# Patient Record
Sex: FEMALE | Race: WHITE | NOT HISPANIC OR LATINO | URBAN - METROPOLITAN AREA
[De-identification: names, ages, dates, MRNs, and addresses within clinical notes are randomized per-mention and may not be internally consistent; named-entity substitution may affect disease eponyms.]

---

## 2019-06-19 RX ADMIN — Medication 800 MILLIGRAM(S): at 15:37

## 2019-07-11 ENCOUNTER — EMERGENCY (EMERGENCY)
Facility: HOSPITAL | Age: 84
LOS: 0 days | Discharge: HOME | End: 2019-07-11
Attending: EMERGENCY MEDICINE | Admitting: EMERGENCY MEDICINE
Payer: COMMERCIAL

## 2019-07-11 VITALS
SYSTOLIC BLOOD PRESSURE: 127 MMHG | RESPIRATION RATE: 17 BRPM | HEART RATE: 77 BPM | DIASTOLIC BLOOD PRESSURE: 82 MMHG | OXYGEN SATURATION: 100 % | TEMPERATURE: 98 F

## 2019-07-11 VITALS
DIASTOLIC BLOOD PRESSURE: 75 MMHG | SYSTOLIC BLOOD PRESSURE: 142 MMHG | WEIGHT: 100.09 LBS | HEIGHT: 66 IN | HEART RATE: 86 BPM | RESPIRATION RATE: 17 BRPM | TEMPERATURE: 98 F | OXYGEN SATURATION: 97 %
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
## 2019-07-11 DIAGNOSIS — M54.2 CERVICALGIA: ICD-10-CM

## 2019-07-11 DIAGNOSIS — S32.10XA UNSPECIFIED FRACTURE OF SACRUM, INITIAL ENCOUNTER FOR CLOSED FRACTURE: ICD-10-CM

## 2019-07-11 DIAGNOSIS — M54.5 LOW BACK PAIN: ICD-10-CM

## 2019-07-11 DIAGNOSIS — Z96.643 PRESENCE OF ARTIFICIAL HIP JOINT, BILATERAL: ICD-10-CM

## 2019-07-11 DIAGNOSIS — M19.90 UNSPECIFIED OSTEOARTHRITIS, UNSPECIFIED SITE: ICD-10-CM

## 2019-07-11 LAB
ALBUMIN SERPL ELPH-MCNC: 3.4 G/DL — SIGNIFICANT CHANGE UP (ref 3.3–5)
ALP SERPL-CCNC: 98 U/L — SIGNIFICANT CHANGE UP (ref 40–120)
ALT FLD-CCNC: 25 U/L — SIGNIFICANT CHANGE UP (ref 12–78)
ANION GAP SERPL CALC-SCNC: 7 MMOL/L — SIGNIFICANT CHANGE UP (ref 5–17)
APPEARANCE UR: CLEAR — SIGNIFICANT CHANGE UP
APTT BLD: 26.2 SEC — LOW (ref 27.5–36.3)
AST SERPL-CCNC: 27 U/L — SIGNIFICANT CHANGE UP (ref 15–37)
BACTERIA # UR AUTO: ABNORMAL
BASOPHILS # BLD AUTO: 0.02 K/UL — SIGNIFICANT CHANGE UP (ref 0–0.2)
BASOPHILS NFR BLD AUTO: 0.2 % — SIGNIFICANT CHANGE UP (ref 0–2)
BILIRUB SERPL-MCNC: 0.5 MG/DL — SIGNIFICANT CHANGE UP (ref 0.2–1.2)
BILIRUB UR-MCNC: NEGATIVE — SIGNIFICANT CHANGE UP
BUN SERPL-MCNC: 22 MG/DL — SIGNIFICANT CHANGE UP (ref 7–23)
CALCIUM SERPL-MCNC: 8 MG/DL — LOW (ref 8.5–10.1)
CHLORIDE SERPL-SCNC: 98 MMOL/L — SIGNIFICANT CHANGE UP (ref 96–108)
CO2 SERPL-SCNC: 25 MMOL/L — SIGNIFICANT CHANGE UP (ref 22–31)
COLOR SPEC: YELLOW — SIGNIFICANT CHANGE UP
CREAT SERPL-MCNC: 0.62 MG/DL — SIGNIFICANT CHANGE UP (ref 0.5–1.3)
DIFF PNL FLD: ABNORMAL
EOSINOPHIL # BLD AUTO: 0.09 K/UL — SIGNIFICANT CHANGE UP (ref 0–0.5)
EOSINOPHIL NFR BLD AUTO: 0.9 % — SIGNIFICANT CHANGE UP (ref 0–6)
EPI CELLS # UR: ABNORMAL
GLUCOSE SERPL-MCNC: 84 MG/DL — SIGNIFICANT CHANGE UP (ref 70–99)
GLUCOSE UR QL: NEGATIVE MG/DL — SIGNIFICANT CHANGE UP
HCT VFR BLD CALC: 28.2 % — LOW (ref 34.5–45)
HGB BLD-MCNC: 8.5 G/DL — LOW (ref 11.5–15.5)
IMM GRANULOCYTES NFR BLD AUTO: 1.3 % — SIGNIFICANT CHANGE UP (ref 0–1.5)
INR BLD: 0.97 RATIO — SIGNIFICANT CHANGE UP (ref 0.88–1.16)
KETONES UR-MCNC: NEGATIVE — SIGNIFICANT CHANGE UP
LEUKOCYTE ESTERASE UR-ACNC: ABNORMAL
LYMPHOCYTES # BLD AUTO: 1.1 K/UL — SIGNIFICANT CHANGE UP (ref 1–3.3)
LYMPHOCYTES # BLD AUTO: 10.7 % — LOW (ref 13–44)
MCHC RBC-ENTMCNC: 24.4 PG — LOW (ref 27–34)
MCHC RBC-ENTMCNC: 30.1 GM/DL — LOW (ref 32–36)
MCV RBC AUTO: 81 FL — SIGNIFICANT CHANGE UP (ref 80–100)
MONOCYTES # BLD AUTO: 1.14 K/UL — HIGH (ref 0–0.9)
MONOCYTES NFR BLD AUTO: 11.1 % — SIGNIFICANT CHANGE UP (ref 2–14)
NEUTROPHILS # BLD AUTO: 7.77 K/UL — HIGH (ref 1.8–7.4)
NEUTROPHILS NFR BLD AUTO: 75.8 % — SIGNIFICANT CHANGE UP (ref 43–77)
NITRITE UR-MCNC: NEGATIVE — SIGNIFICANT CHANGE UP
PH UR: 6 — SIGNIFICANT CHANGE UP (ref 5–8)
PLATELET # BLD AUTO: 316 K/UL — SIGNIFICANT CHANGE UP (ref 150–400)
POTASSIUM SERPL-MCNC: 4.2 MMOL/L — SIGNIFICANT CHANGE UP (ref 3.5–5.3)
POTASSIUM SERPL-SCNC: 4.2 MMOL/L — SIGNIFICANT CHANGE UP (ref 3.5–5.3)
PROT SERPL-MCNC: 6.5 GM/DL — SIGNIFICANT CHANGE UP (ref 6–8.3)
PROT UR-MCNC: 15 MG/DL
PROTHROM AB SERPL-ACNC: 10.7 SEC — SIGNIFICANT CHANGE UP (ref 10–12.9)
RBC # BLD: 3.48 M/UL — LOW (ref 3.8–5.2)
RBC # FLD: 16.3 % — HIGH (ref 10.3–14.5)
RBC CASTS # UR COMP ASSIST: ABNORMAL /HPF (ref 0–4)
SODIUM SERPL-SCNC: 130 MMOL/L — LOW (ref 135–145)
SP GR SPEC: 1.01 — SIGNIFICANT CHANGE UP (ref 1.01–1.02)
UROBILINOGEN FLD QL: NEGATIVE MG/DL — SIGNIFICANT CHANGE UP
WBC # BLD: 10.25 K/UL — SIGNIFICANT CHANGE UP (ref 3.8–10.5)
WBC # FLD AUTO: 10.25 K/UL — SIGNIFICANT CHANGE UP (ref 3.8–10.5)
WBC UR QL: SIGNIFICANT CHANGE UP

## 2019-07-11 PROCEDURE — 93010 ELECTROCARDIOGRAM REPORT: CPT

## 2019-07-11 PROCEDURE — 72125 CT NECK SPINE W/O DYE: CPT | Mod: 26

## 2019-07-11 PROCEDURE — 76376 3D RENDER W/INTRP POSTPROCES: CPT | Mod: 26

## 2019-07-11 PROCEDURE — 86077 PHYS BLOOD BANK SERV XMATCH: CPT

## 2019-07-11 PROCEDURE — 70450 CT HEAD/BRAIN W/O DYE: CPT | Mod: 26

## 2019-07-11 PROCEDURE — 99285 EMERGENCY DEPT VISIT HI MDM: CPT

## 2019-07-11 PROCEDURE — 73523 X-RAY EXAM HIPS BI 5/> VIEWS: CPT | Mod: 26

## 2019-07-11 PROCEDURE — 72192 CT PELVIS W/O DYE: CPT | Mod: 26

## 2019-07-11 PROCEDURE — 73552 X-RAY EXAM OF FEMUR 2/>: CPT | Mod: 26,50

## 2019-07-11 RX ORDER — SODIUM CHLORIDE 9 MG/ML
1000 INJECTION INTRAMUSCULAR; INTRAVENOUS; SUBCUTANEOUS ONCE
Refills: 0 | Status: COMPLETED | OUTPATIENT
Start: 2019-07-11 | End: 2019-07-11

## 2019-07-11 RX ORDER — ACETAMINOPHEN 500 MG
1000 TABLET ORAL ONCE
Refills: 0 | Status: COMPLETED | OUTPATIENT
Start: 2019-07-11 | End: 2019-07-11

## 2019-07-11 RX ADMIN — SODIUM CHLORIDE 1000 MILLILITER(S): 9 INJECTION INTRAMUSCULAR; INTRAVENOUS; SUBCUTANEOUS at 17:58

## 2019-07-11 RX ADMIN — Medication 1000 MILLIGRAM(S): at 17:44

## 2019-07-11 RX ADMIN — Medication 1000 MILLIGRAM(S): at 18:14

## 2019-07-11 NOTE — ED PROVIDER NOTE - MUSCULOSKELETAL, MLM
+TTP bilateral ischial tuberosity. Mild distal right femur TTP w/ no deformity. Mild neck tenderness w/ no step off or deformity and normal ROM.

## 2019-07-11 NOTE — ED PROVIDER NOTE - OBJECTIVE STATEMENT
89 y/o female with PMHx of arthritis and s/p b/l hip replacement (done by Dr. Watson) presents to the ED BIBEMS s/p slip out of the chair. Pt says she fell on buttocks and hit the back of her head. -LOC.  +buttocks and neck pain. Cannot stand as she normally can. Pt normally walks with a walker. Pt is not on blood thinners.

## 2019-07-11 NOTE — ED ADULT NURSE NOTE - NSIMPLEMENTINTERV_GEN_ALL_ED
Implemented All Fall with Harm Risk Interventions:  [place] to call system. Call bell, personal items and telephone within reach. Instruct patient to call for assistance. Room bathroom lighting operational. Non-slip footwear when patient is off stretcher. Physically safe environment: no spills, clutter or unnecessary equipment. Stretcher in lowest position, wheels locked, appropriate side rails in place. Provide visual cue, wrist band, yellow gown, etc. Monitor gait and stability. Monitor for mental status changes and reorient to person, place, and time. Review medications for side effects contributing to fall risk. Reinforce activity limits and safety measures with patient and family. Provide visual clues: red socks.

## 2019-07-11 NOTE — ED ADULT NURSE NOTE - OBJECTIVE STATEMENT
Pt [place] s/p fall off her chair while at work at the [employer]. Pt states that she turned to get papers and slid right off the chair landing on her buttocks and then fell back and hit her head. Pt denies and LOC. Pt denies any AC. Pt walks with a walker and was unable to get up and walk after incident. Pt has a hx of bilateral hip replacements.

## 2019-07-11 NOTE — ED ADULT TRIAGE NOTE - CHIEF COMPLAINT QUOTE
patient brought in by EMS after patient states she slid out of her chair at the library. patient reports she fell onto her buttock and then hit her head on the floor. denies use of blood thinners. as per EMS, patient normally ambulates with walker but was unable to stand after fall. no LOC reported.

## 2019-07-11 NOTE — ED PROVIDER NOTE - PROGRESS NOTE DETAILS
Marie BHAT for ED attending, Dr. Gauthier: Pt ambulated well. Signing chart today, noticed CT pelvis was addended from chronic pelvic fx, to include a minimally displaced transversely oriented fracture of the sacrum with muscular edema and hematoma.  Apparently this was communicated today with the ED.  I will speak with PAs about this and see if patient was contacted.  If not, I will contact myself.  Will need to speak with orthopedics about plan for patient.  Booker Gauthier D.O.

## 2019-07-11 NOTE — ED ADULT NURSE REASSESSMENT NOTE - NS ED NURSE REASSESS COMMENT FT1
Pt able to ambulate with walker with minimal pain. Pt uses walker on daily basis. Plan is for pt to be discharged home with son. Comfort and safety maintained.

## 2019-07-11 NOTE — ED ADULT NURSE NOTE - CHPI ED NUR SYMPTOMS NEG
no fever/no loss of consciousness/no confusion/no numbness/no tingling/no bleeding/no vomiting/no abrasion/no deformity

## 2019-07-12 ENCOUNTER — INPATIENT (INPATIENT)
Facility: HOSPITAL | Age: 84
LOS: 9 days | Discharge: HOME | End: 2019-07-22
Attending: HOSPITALIST | Admitting: HOSPITALIST
Payer: COMMERCIAL

## 2019-07-12 VITALS
TEMPERATURE: 98 F | HEIGHT: 66 IN | OXYGEN SATURATION: 95 % | DIASTOLIC BLOOD PRESSURE: 65 MMHG | HEART RATE: 91 BPM | RESPIRATION RATE: 17 BRPM | WEIGHT: 100.09 LBS | SYSTOLIC BLOOD PRESSURE: 122 MMHG

## 2019-07-12 DIAGNOSIS — Z96.643 PRESENCE OF ARTIFICIAL HIP JOINT, BILATERAL: Chronic | ICD-10-CM

## 2019-07-12 LAB
ALBUMIN SERPL ELPH-MCNC: 2.7 G/DL — LOW (ref 3.3–5)
ALP SERPL-CCNC: 91 U/L — SIGNIFICANT CHANGE UP (ref 40–120)
ALT FLD-CCNC: 27 U/L — SIGNIFICANT CHANGE UP (ref 12–78)
ANION GAP SERPL CALC-SCNC: 10 MMOL/L — SIGNIFICANT CHANGE UP (ref 5–17)
ANISOCYTOSIS BLD QL: SIGNIFICANT CHANGE UP
APPEARANCE UR: CLEAR — SIGNIFICANT CHANGE UP
APTT BLD: 26.5 SEC — LOW (ref 27.5–36.3)
AST SERPL-CCNC: 23 U/L — SIGNIFICANT CHANGE UP (ref 15–37)
BACTERIA # UR AUTO: ABNORMAL
BASOPHILS # BLD AUTO: 0 K/UL — SIGNIFICANT CHANGE UP (ref 0–0.2)
BASOPHILS NFR BLD AUTO: 0 % — SIGNIFICANT CHANGE UP (ref 0–2)
BILIRUB SERPL-MCNC: 0.4 MG/DL — SIGNIFICANT CHANGE UP (ref 0.2–1.2)
BILIRUB UR-MCNC: NEGATIVE — SIGNIFICANT CHANGE UP
BLD GP AB SCN SERPL QL: ABNORMAL
BUN SERPL-MCNC: 18 MG/DL — SIGNIFICANT CHANGE UP (ref 7–23)
CALCIUM SERPL-MCNC: 8.1 MG/DL — LOW (ref 8.5–10.1)
CHLORIDE SERPL-SCNC: 104 MMOL/L — SIGNIFICANT CHANGE UP (ref 96–108)
CO2 SERPL-SCNC: 22 MMOL/L — SIGNIFICANT CHANGE UP (ref 22–31)
COLOR SPEC: YELLOW — SIGNIFICANT CHANGE UP
CREAT SERPL-MCNC: 0.46 MG/DL — LOW (ref 0.5–1.3)
DIFF PNL FLD: NEGATIVE — SIGNIFICANT CHANGE UP
EOSINOPHIL # BLD AUTO: 0.11 K/UL — SIGNIFICANT CHANGE UP (ref 0–0.5)
EOSINOPHIL NFR BLD AUTO: 1 % — SIGNIFICANT CHANGE UP (ref 0–6)
EPI CELLS # UR: ABNORMAL
GLUCOSE SERPL-MCNC: 123 MG/DL — HIGH (ref 70–99)
GLUCOSE UR QL: NEGATIVE MG/DL — SIGNIFICANT CHANGE UP
HCT VFR BLD CALC: 23 % — LOW (ref 34.5–45)
HGB BLD-MCNC: 6.9 G/DL — CRITICAL LOW (ref 11.5–15.5)
HYPOCHROMIA BLD QL: SIGNIFICANT CHANGE UP
INR BLD: 1.04 RATIO — SIGNIFICANT CHANGE UP (ref 0.88–1.16)
KETONES UR-MCNC: ABNORMAL
LEUKOCYTE ESTERASE UR-ACNC: ABNORMAL
LYMPHOCYTES # BLD AUTO: 0.33 K/UL — LOW (ref 1–3.3)
LYMPHOCYTES # BLD AUTO: 3 % — LOW (ref 13–44)
MANUAL SMEAR VERIFICATION: SIGNIFICANT CHANGE UP
MCHC RBC-ENTMCNC: 23.9 PG — LOW (ref 27–34)
MCHC RBC-ENTMCNC: 30 GM/DL — LOW (ref 32–36)
MCV RBC AUTO: 79.6 FL — LOW (ref 80–100)
MONOCYTES # BLD AUTO: 0.56 K/UL — SIGNIFICANT CHANGE UP (ref 0–0.9)
MONOCYTES NFR BLD AUTO: 5 % — SIGNIFICANT CHANGE UP (ref 2–14)
NEUTROPHILS # BLD AUTO: 10.11 K/UL — HIGH (ref 1.8–7.4)
NEUTROPHILS NFR BLD AUTO: 91 % — HIGH (ref 43–77)
NITRITE UR-MCNC: NEGATIVE — SIGNIFICANT CHANGE UP
NRBC # BLD: 0 /100 — SIGNIFICANT CHANGE UP (ref 0–0)
NRBC # BLD: SIGNIFICANT CHANGE UP /100 WBCS (ref 0–0)
PH UR: 5 — SIGNIFICANT CHANGE UP (ref 5–8)
PLAT MORPH BLD: NORMAL — SIGNIFICANT CHANGE UP
PLATELET # BLD AUTO: 228 K/UL — SIGNIFICANT CHANGE UP (ref 150–400)
POIKILOCYTOSIS BLD QL AUTO: SIGNIFICANT CHANGE UP
POLYCHROMASIA BLD QL SMEAR: SLIGHT — SIGNIFICANT CHANGE UP
POTASSIUM SERPL-MCNC: 3.8 MMOL/L — SIGNIFICANT CHANGE UP (ref 3.5–5.3)
POTASSIUM SERPL-SCNC: 3.8 MMOL/L — SIGNIFICANT CHANGE UP (ref 3.5–5.3)
PROT SERPL-MCNC: 5.4 GM/DL — LOW (ref 6–8.3)
PROT UR-MCNC: NEGATIVE MG/DL — SIGNIFICANT CHANGE UP
PROTHROM AB SERPL-ACNC: 11.6 SEC — SIGNIFICANT CHANGE UP (ref 10–12.9)
RBC # BLD: 2.89 M/UL — LOW (ref 3.8–5.2)
RBC # FLD: 16.3 % — HIGH (ref 10.3–14.5)
RBC BLD AUTO: ABNORMAL
RBC CASTS # UR COMP ASSIST: ABNORMAL /HPF (ref 0–4)
SODIUM SERPL-SCNC: 136 MMOL/L — SIGNIFICANT CHANGE UP (ref 135–145)
SP GR SPEC: 1.01 — SIGNIFICANT CHANGE UP (ref 1.01–1.02)
TYPE + AB SCN PNL BLD: SIGNIFICANT CHANGE UP
UROBILINOGEN FLD QL: NEGATIVE MG/DL — SIGNIFICANT CHANGE UP
WBC # BLD: 11.11 K/UL — HIGH (ref 3.8–10.5)
WBC # FLD AUTO: 11.11 K/UL — HIGH (ref 3.8–10.5)
WBC UR QL: >50

## 2019-07-12 PROCEDURE — 71045 X-RAY EXAM CHEST 1 VIEW: CPT | Mod: 26

## 2019-07-12 PROCEDURE — 93010 ELECTROCARDIOGRAM REPORT: CPT

## 2019-07-12 PROCEDURE — 72110 X-RAY EXAM L-2 SPINE 4/>VWS: CPT | Mod: 26

## 2019-07-12 PROCEDURE — 99223 1ST HOSP IP/OBS HIGH 75: CPT

## 2019-07-12 PROCEDURE — 72220 X-RAY EXAM SACRUM TAILBONE: CPT | Mod: 26

## 2019-07-12 PROCEDURE — 99285 EMERGENCY DEPT VISIT HI MDM: CPT

## 2019-07-12 RX ORDER — ONDANSETRON 8 MG/1
4 TABLET, FILM COATED ORAL ONCE
Refills: 0 | Status: COMPLETED | OUTPATIENT
Start: 2019-07-12 | End: 2019-07-12

## 2019-07-12 RX ORDER — LOSARTAN POTASSIUM 100 MG/1
25 TABLET, FILM COATED ORAL DAILY
Refills: 0 | Status: DISCONTINUED | OUTPATIENT
Start: 2019-07-12 | End: 2019-07-13

## 2019-07-12 RX ORDER — IRBESARTAN 75 MG/1
1 TABLET ORAL
Qty: 0 | Refills: 0 | DISCHARGE

## 2019-07-12 RX ORDER — IBUPROFEN 200 MG
1 TABLET ORAL
Qty: 0 | Refills: 0 | DISCHARGE

## 2019-07-12 RX ORDER — OXYCODONE AND ACETAMINOPHEN 5; 325 MG/1; MG/1
1 TABLET ORAL ONCE
Refills: 0 | Status: DISCONTINUED | OUTPATIENT
Start: 2019-07-12 | End: 2019-07-12

## 2019-07-12 RX ORDER — ACETAMINOPHEN 500 MG
650 TABLET ORAL EVERY 8 HOURS
Refills: 0 | Status: DISCONTINUED | OUTPATIENT
Start: 2019-07-12 | End: 2019-07-22

## 2019-07-12 RX ORDER — ATORVASTATIN CALCIUM 80 MG/1
20 TABLET, FILM COATED ORAL DAILY
Refills: 0 | Status: DISCONTINUED | OUTPATIENT
Start: 2019-07-12 | End: 2019-07-13

## 2019-07-12 RX ORDER — MORPHINE SULFATE 50 MG/1
2 CAPSULE, EXTENDED RELEASE ORAL ONCE
Refills: 0 | Status: DISCONTINUED | OUTPATIENT
Start: 2019-07-12 | End: 2019-07-12

## 2019-07-12 RX ORDER — SODIUM CHLORIDE 9 MG/ML
3 INJECTION INTRAMUSCULAR; INTRAVENOUS; SUBCUTANEOUS ONCE
Refills: 0 | Status: COMPLETED | OUTPATIENT
Start: 2019-07-12 | End: 2019-07-12

## 2019-07-12 RX ORDER — TIMOLOL 0.5 %
1 DROPS OPHTHALMIC (EYE) DAILY
Refills: 0 | Status: DISCONTINUED | OUTPATIENT
Start: 2019-07-12 | End: 2019-07-13

## 2019-07-12 RX ORDER — DOCUSATE SODIUM 100 MG
100 CAPSULE ORAL
Refills: 0 | Status: DISCONTINUED | OUTPATIENT
Start: 2019-07-12 | End: 2019-07-22

## 2019-07-12 RX ORDER — ATORVASTATIN CALCIUM 80 MG/1
1 TABLET, FILM COATED ORAL
Qty: 0 | Refills: 0 | DISCHARGE

## 2019-07-12 RX ORDER — OXYCODONE HYDROCHLORIDE 5 MG/1
2.5 TABLET ORAL EVERY 6 HOURS
Refills: 0 | Status: DISCONTINUED | OUTPATIENT
Start: 2019-07-12 | End: 2019-07-13

## 2019-07-12 RX ORDER — SODIUM CHLORIDE 9 MG/ML
500 INJECTION INTRAMUSCULAR; INTRAVENOUS; SUBCUTANEOUS ONCE
Refills: 0 | Status: COMPLETED | OUTPATIENT
Start: 2019-07-12 | End: 2019-07-12

## 2019-07-12 RX ADMIN — ONDANSETRON 4 MILLIGRAM(S): 8 TABLET, FILM COATED ORAL at 23:29

## 2019-07-12 RX ADMIN — SODIUM CHLORIDE 3 MILLILITER(S): 9 INJECTION INTRAMUSCULAR; INTRAVENOUS; SUBCUTANEOUS at 19:59

## 2019-07-12 RX ADMIN — MORPHINE SULFATE 2 MILLIGRAM(S): 50 CAPSULE, EXTENDED RELEASE ORAL at 19:52

## 2019-07-12 RX ADMIN — SODIUM CHLORIDE 500 MILLILITER(S): 9 INJECTION INTRAMUSCULAR; INTRAVENOUS; SUBCUTANEOUS at 19:40

## 2019-07-12 RX ADMIN — ONDANSETRON 4 MILLIGRAM(S): 8 TABLET, FILM COATED ORAL at 19:37

## 2019-07-12 RX ADMIN — MORPHINE SULFATE 2 MILLIGRAM(S): 50 CAPSULE, EXTENDED RELEASE ORAL at 19:37

## 2019-07-12 RX ADMIN — OXYCODONE AND ACETAMINOPHEN 1 TABLET(S): 5; 325 TABLET ORAL at 22:36

## 2019-07-12 NOTE — CONSULT NOTE ADULT - SUBJECTIVE AND OBJECTIVE BOX
Patient is a 88y Female called back to ED today after change to radiology read showing sacral fracture from fall/ED presentation yesterday 19.  Had mechanical fall day prior to admission with buttocks pain but was able to ambulate and was discharged home.  Has had difficulty ambulating since being home with persistent buttocks pain. Ambulates with walker at baseline.  Denies any worsening of injury/pain over last 24 hrs and denies any new falls/trauma.  Denies pain/injury elsewhere. Denies numbness/tingling/paresthesias/weakness. Denies bowel/bladder incontinence. Denies fevers/chills. No other complaints at this time.    PAST MEDICAL & SURGICAL HISTORY:  Blindness of left eye  Glaucoma, right eye  Osteoporosis  Hyperlipidemia  HTN (hypertension)  Arthritis  H/O right wrist surgery  H/O bilateral hip replacements    MEDICATIONS  (STANDING):  acetaminophen   Tablet .. 650 milliGRAM(s) Oral every 8 hours  atorvastatin 20 milliGRAM(s) Oral daily  docusate sodium 100 milliGRAM(s) Oral two times a day  losartan 25 milliGRAM(s) Oral daily  timolol 0.5% Solution 1 Drop(s) Right EYE daily      Allergies    No Known Allergies    Intolerances                           6.9    11.11 )-----------( 228      ( 2019 19:31 )             23.0       2019 19:31    136    |  104    |  18     ----------------------------<  123    3.8     |  22     |  0.46     Ca    8.1        2019 19:31    TPro  5.4    /  Alb  2.7    /  TBili  0.4    /  DBili  x      /  AST  23     /  ALT  27     /  AlkPhos  91     2019 19:31      PT/INR - ( 2019 19:31 )   PT: 11.6 sec;   INR: 1.04 ratio         PTT - ( 2019 19:31 )  PTT:26.5 sec    Urinalysis Basic - ( 2019 22:11 )    Color: Yellow / Appearance: Clear / S.015 / pH: x  Gluc: x / Ketone: Trace  / Bili: Negative / Urobili: Negative mg/dL   Blood: x / Protein: Negative mg/dL / Nitrite: Negative   Leuk Esterase: Small / RBC: 25-50 /HPF / WBC >50   Sq Epi: x / Non Sq Epi: Moderate / Bacteria: Moderate        Vital Signs Last 24 Hrs  T(C): 36.7 (19 @ 00:47), Max: 37.2 (19 @ 23:45)  T(F): 98.1 (19 @ 00:47), Max: 98.9 (19 @ 23:45)  HR: 94 (19 @ 00:47) (90 - 94)  BP: 116/65 (19 @ 00:47) (105/55 - 122/65)  BP(mean): 78 (19 @ 00:47) (78 - 78)  RR: 24 (19 @ 00:47) (17 - 24)  SpO2: 93% (19 @ 00:47) (93% - 95%)    Gen: NAD    Spine PE:  Skin intact  No gross deformity  No midnline TTP Sacral spine, No TTP C/T/L Spine  No bony step offs  No paraspinal muscle ttp/hypertonicity   Negative clonus  Negative babinski  Negative arango  No saddle anesthesia    Motor:                   C5                C6              C7               C8           T1   R            5/5                5/5            5/5             5/5          5/5  L             5/5               5/5             5/5             5/5          5/5                L2             L3             L4               L5            S1  R         5/5           5/5          5/5             5/5           5/5  L          5/5          5/5           5/5             5/5           5/5    Sensory:            C5         C6         C7      C8       T1        (0=absent, 1=impaired, 2=normal, NT=not testable)  R         2            2           2        2         2  L          2            2           2        2         2               L2          L3         L4      L5       S1         (0=absent, 1=impaired, 2=normal, NT=not testable)  R         2            2            2        2        2  L          2            2           2        2         2    Secondary Survey: No TTP over bony prominences, SILT, palpable pulses, full/painless range of motion, compartments soft    Imaging: Xrays/CT from 19 demonstrate displaced transverse S2 fracture    A/P: 88y Female with with sacral fracture  Pain control  WBAT with assistive devices as needed  FU Labs/imaging  SCDs  No acute orthopedic surgical intervention  Can follow up with Dr. Mixon as outpatient once discharged from hospital or rehab  Discussed with Dr. Mixon, agrees with above

## 2019-07-12 NOTE — H&P ADULT - NSHPPHYSICALEXAM_GEN_ALL_CORE
BP = 102/60  HR = 90 irregularly irregular  RR = 18  SpO2 = 96% on room air    A+Ox3  left eye mostly closed and eyelids sunken (not wearing prosthesis)  mucous membranes moist  soft / NT / ND  sacral tenderness  no bruising over sacrum  1+ bilateral DP pulses  pale  calm

## 2019-07-12 NOTE — ED PROVIDER NOTE - ENMT, MLM
Airway patent, Nasal mucosa clear. Throat has no vesicles, no oropharyngeal exudates and uvula is midline. Head: normocephalic, atraumatic. +mucous membranes slightly dry

## 2019-07-12 NOTE — H&P ADULT - HISTORY OF PRESENT ILLNESS
88F fall from standing yesterday, no LOC  seen and examined 07-12-19 @ 2040 as trauma consult (consulted 07-12-19 @ 2035)    seen in ER yesterday and discharged home.  she was able to ambulate, but with a significant amount of pain in the sacrum.  she was called back to the ER today for a missed sacral fracture on CT pelvis.  no prior falls.

## 2019-07-12 NOTE — ED PROVIDER NOTE - MUSCULOSKELETAL, MLM
Spine appears normal, bilateral lower extremities motor sensory intact. neck: non tender, supple, without pain. pelvis: non tender, stable. +sacrum TTP. +DP pulses decreased, bilaterally equal +bilateral legs nontender, SLR 10 degrees bilaterally

## 2019-07-12 NOTE — ED PROVIDER NOTE - CLINICAL SUMMARY MEDICAL DECISION MAKING FREE TEXT BOX
89 y/o female with PMHx of arthritis and s/p bilateral hip replacement (done by Dr. Watson) presents to the ED BIBA from home as radiology recall. Yesterday, pt slipped out of chair onto buttocks. Workup with no acute injury +acute sacral fx. Over course of today, worsening pain, unable to ambulate despite walker assistance. Unable to perform ADLs, +lives alone. CT pelvis official reread today, +acute sacral fx with mild displacement and hematoma. Plan: repeat labs, spine consult, pt will require admission for pain management, PT, expect rehab placement.

## 2019-07-12 NOTE — H&P ADULT - NSHPSOCIALHISTORY_GEN_ALL_CORE
lives alone  works at [employer]  no tobacco  1 glass wine with dinner  no drugs lives alone  works as an  at [employer]  no tobacco  1 glass wine with dinner  no drugs

## 2019-07-12 NOTE — ED PROVIDER NOTE - SKIN, MLM
Skin normal color for race, warm, dry and intact. No evidence of rash. No discoloration overlying the sacrum

## 2019-07-12 NOTE — ED ADULT NURSE NOTE - OBJECTIVE STATEMENT
Patient presenst with lower back pain, patient was evaluated yesterday for slip and fall and was discharged home. Patient reports increased pain after discharge. CT results were amended after discharge. Patient reports difficulty standing / ambulating at home. Patient lives alone and uses walker

## 2019-07-12 NOTE — H&P ADULT - NSICDXPASTMEDICALHX_GEN_ALL_CORE_FT
PAST MEDICAL HISTORY:  Arthritis     Blindness of left eye     Glaucoma, right eye     HTN (hypertension)     Hyperlipidemia     Osteoporosis

## 2019-07-12 NOTE — ED PROVIDER NOTE - CARE PLAN
Principal Discharge DX:	Closed fracture of sacrum, unspecified portion of sacrum, sequela  Secondary Diagnosis:	Anemia

## 2019-07-12 NOTE — ED POST DISCHARGE NOTE - RESULT SUMMARY
Contacted patient's son at home reported revised reading of CT Pelvis revealing +sacral fracture. Reports patient having increased pain and difficulty walking and will bring patient back to ED. Marcos NP

## 2019-07-12 NOTE — H&P ADULT - NSHPLABSRESULTS_GEN_ALL_CORE
hgb = 6.9 g/dL (8.5 yesterday)  MCV = 79.6  HCO3 = 22    CT head / c-spine (7/11) - no injury    CT pelvis (7/11) - S2-3 minimally displaced transverse fracture. small bilateral piriformis hematomas. chronic pelvic fractures with malunion.    bilateral hip/femur X-rays (7/11) - bilateral CAYDEN without periprosthetic fractures    CXR (7/12) - no pneumothorax or hemothorax

## 2019-07-12 NOTE — ED ADULT TRIAGE NOTE - CHIEF COMPLAINT QUOTE
patient brought in by EMS for lower back pain and difficulty ambulating. patient was evaluated in ED yesterday s/p fall and the revised CT reading today showed a sacral fracture. patient in no acute distress at this time. patient worsens with movement.

## 2019-07-12 NOTE — H&P ADULT - ASSESSMENT
S2-3 minimally displaced sacral fractures  -WBAT is X-rays are stable (she was already ambulating at home yesterday)  -Tylenol and oxycodone PRN pain  -PT consult    small presacral hematoma  -admit to stepdown for close hemodynamic monitoring  -no evidence of ongoing hemorrhage based on stable VS and no metabolic acidosis  -repeat CBC tonight  -start chemical VTE prophylaxis if hgb remains stable tomorrow  -hold ibuprofen    new onset afib  -not currently a candidate for therapeutic anticoagulation given pelvic hematoma  -f/u with PMD S2-3 minimally displaced sacral fractures  -ortho spine consult  -WBAT is X-rays are stable (she was already ambulating at home yesterday)  -Tylenol and oxycodone PRN pain  -PT consult    small presacral hematoma  -admit to stepdown for close hemodynamic monitoring  -no evidence of ongoing hemorrhage based on stable VS and no metabolic acidosis  -repeat CBC tonight  -start chemical VTE prophylaxis if hgb remains stable tomorrow  -hold ibuprofen    new onset afib  -not currently a candidate for therapeutic anticoagulation given pelvic hematoma  -f/u with PMD

## 2019-07-12 NOTE — ED PROVIDER NOTE - OBJECTIVE STATEMENT
89 y/o female with PMHx of arthritis and s/p bilateral hip replacement (done by Dr. Watson) presents to the ED BIBEMS c/o lower back pain. Pt was evaluated at [facility] yesterday s/p slip out of the chair, landing on buttocks. +hit back of head. Pt presents today for radiology call back after chronic sacral fx was amended today as acute sacral fracture with muscular edema and hematoma. Today, pt states difficulty standing and ambulating secondary to worsening posterior pelvis/back pain. Denies headache, nausea. Uses walker at baseline. Lives alone at home. NKDA. PMD: Jessica.

## 2019-07-12 NOTE — ED PROVIDER NOTE - PROGRESS NOTE DETAILS
Marie OLIVIER for ED attending, Dr. Olmos: Paging spine on-call. Dr. Olmos:  Paging Trauma attdg on call & re-paging Spine attdg on call. Marie OLIVIER for ED attending, Dr. Olmos: [facility] aware of consult. Will see pt in AM. No acute intervention indicated tonight.

## 2019-07-13 DIAGNOSIS — Z98.890 OTHER SPECIFIED POSTPROCEDURAL STATES: Chronic | ICD-10-CM

## 2019-07-13 LAB
HCT VFR BLD CALC: 21.1 % — LOW (ref 34.5–45)
HCT VFR BLD CALC: 27.5 % — LOW (ref 34.5–45)
HCT VFR BLD CALC: 27.7 % — LOW (ref 34.5–45)
HGB BLD-MCNC: 6.3 G/DL — CRITICAL LOW (ref 11.5–15.5)
HGB BLD-MCNC: 8.5 G/DL — LOW (ref 11.5–15.5)
HGB BLD-MCNC: 8.6 G/DL — LOW (ref 11.5–15.5)
IRON SATN MFR SERPL: 29 % — SIGNIFICANT CHANGE UP (ref 14–50)
IRON SATN MFR SERPL: 95 UG/DL — SIGNIFICANT CHANGE UP (ref 30–160)
MCHC RBC-ENTMCNC: 24 PG — LOW (ref 27–34)
MCHC RBC-ENTMCNC: 25.1 PG — LOW (ref 27–34)
MCHC RBC-ENTMCNC: 25.1 PG — LOW (ref 27–34)
MCHC RBC-ENTMCNC: 29.9 GM/DL — LOW (ref 32–36)
MCHC RBC-ENTMCNC: 30.7 GM/DL — LOW (ref 32–36)
MCHC RBC-ENTMCNC: 31.3 GM/DL — LOW (ref 32–36)
MCV RBC AUTO: 80.2 FL — SIGNIFICANT CHANGE UP (ref 80–100)
MCV RBC AUTO: 80.5 FL — SIGNIFICANT CHANGE UP (ref 80–100)
MCV RBC AUTO: 81.7 FL — SIGNIFICANT CHANGE UP (ref 80–100)
PLATELET # BLD AUTO: 195 K/UL — SIGNIFICANT CHANGE UP (ref 150–400)
PLATELET # BLD AUTO: 211 K/UL — SIGNIFICANT CHANGE UP (ref 150–400)
PLATELET # BLD AUTO: 215 K/UL — SIGNIFICANT CHANGE UP (ref 150–400)
RBC # BLD: 2.62 M/UL — LOW (ref 3.8–5.2)
RBC # BLD: 3.39 M/UL — LOW (ref 3.8–5.2)
RBC # BLD: 3.43 M/UL — LOW (ref 3.8–5.2)
RBC # FLD: 15.7 % — HIGH (ref 10.3–14.5)
RBC # FLD: 16.1 % — HIGH (ref 10.3–14.5)
RBC # FLD: 16.4 % — HIGH (ref 10.3–14.5)
TIBC SERPL-MCNC: 326 UG/DL — SIGNIFICANT CHANGE UP (ref 220–430)
UIBC SERPL-MCNC: 231 UG/DL — SIGNIFICANT CHANGE UP (ref 110–370)
WBC # BLD: 10.57 K/UL — HIGH (ref 3.8–10.5)
WBC # BLD: 8.62 K/UL — SIGNIFICANT CHANGE UP (ref 3.8–10.5)
WBC # BLD: 9.47 K/UL — SIGNIFICANT CHANGE UP (ref 3.8–10.5)
WBC # FLD AUTO: 10.57 K/UL — HIGH (ref 3.8–10.5)
WBC # FLD AUTO: 8.62 K/UL — SIGNIFICANT CHANGE UP (ref 3.8–10.5)
WBC # FLD AUTO: 9.47 K/UL — SIGNIFICANT CHANGE UP (ref 3.8–10.5)

## 2019-07-13 PROCEDURE — 99233 SBSQ HOSP IP/OBS HIGH 50: CPT

## 2019-07-13 RX ORDER — ATORVASTATIN CALCIUM 80 MG/1
10 TABLET, FILM COATED ORAL DAILY
Refills: 0 | Status: DISCONTINUED | OUTPATIENT
Start: 2019-07-13 | End: 2019-07-22

## 2019-07-13 RX ORDER — LATANOPROST 0.05 MG/ML
1 SOLUTION/ DROPS OPHTHALMIC; TOPICAL AT BEDTIME
Refills: 0 | Status: DISCONTINUED | OUTPATIENT
Start: 2019-07-13 | End: 2019-07-22

## 2019-07-13 RX ORDER — IRBESARTAN 75 MG/1
1 TABLET ORAL
Qty: 0 | Refills: 0 | DISCHARGE

## 2019-07-13 RX ORDER — LEVOBUNOLOL HCL 0.5 %
2 DROPS OPHTHALMIC (EYE)
Qty: 0 | Refills: 0 | DISCHARGE

## 2019-07-13 RX ORDER — LOSARTAN POTASSIUM 100 MG/1
50 TABLET, FILM COATED ORAL DAILY
Refills: 0 | Status: DISCONTINUED | OUTPATIENT
Start: 2019-07-13 | End: 2019-07-13

## 2019-07-13 RX ORDER — IBUPROFEN 200 MG
1 TABLET ORAL
Qty: 0 | Refills: 0 | DISCHARGE

## 2019-07-13 RX ORDER — MORPHINE SULFATE 50 MG/1
2 CAPSULE, EXTENDED RELEASE ORAL EVERY 4 HOURS
Refills: 0 | Status: DISCONTINUED | OUTPATIENT
Start: 2019-07-13 | End: 2019-07-14

## 2019-07-13 RX ORDER — ATORVASTATIN CALCIUM 80 MG/1
1 TABLET, FILM COATED ORAL
Qty: 0 | Refills: 0 | DISCHARGE

## 2019-07-13 RX ORDER — TRAMADOL HYDROCHLORIDE 50 MG/1
25 TABLET ORAL EVERY 6 HOURS
Refills: 0 | Status: DISCONTINUED | OUTPATIENT
Start: 2019-07-13 | End: 2019-07-14

## 2019-07-13 RX ORDER — TRAMADOL HYDROCHLORIDE 50 MG/1
50 TABLET ORAL EVERY 6 HOURS
Refills: 0 | Status: DISCONTINUED | OUTPATIENT
Start: 2019-07-13 | End: 2019-07-14

## 2019-07-13 RX ADMIN — OXYCODONE HYDROCHLORIDE 2.5 MILLIGRAM(S): 5 TABLET ORAL at 09:30

## 2019-07-13 RX ADMIN — OXYCODONE AND ACETAMINOPHEN 1 TABLET(S): 5; 325 TABLET ORAL at 00:26

## 2019-07-13 RX ADMIN — Medication 650 MILLIGRAM(S): at 05:43

## 2019-07-13 RX ADMIN — LATANOPROST 1 DROP(S): 0.05 SOLUTION/ DROPS OPHTHALMIC; TOPICAL at 21:07

## 2019-07-13 RX ADMIN — OXYCODONE HYDROCHLORIDE 2.5 MILLIGRAM(S): 5 TABLET ORAL at 10:00

## 2019-07-13 RX ADMIN — Medication 650 MILLIGRAM(S): at 21:00

## 2019-07-13 RX ADMIN — TRAMADOL HYDROCHLORIDE 50 MILLIGRAM(S): 50 TABLET ORAL at 17:23

## 2019-07-13 RX ADMIN — ATORVASTATIN CALCIUM 10 MILLIGRAM(S): 80 TABLET, FILM COATED ORAL at 13:28

## 2019-07-13 RX ADMIN — MORPHINE SULFATE 2 MILLIGRAM(S): 50 CAPSULE, EXTENDED RELEASE ORAL at 22:42

## 2019-07-13 RX ADMIN — Medication 100 MILLIGRAM(S): at 05:01

## 2019-07-13 RX ADMIN — Medication 650 MILLIGRAM(S): at 20:24

## 2019-07-13 RX ADMIN — Medication 650 MILLIGRAM(S): at 13:30

## 2019-07-13 RX ADMIN — MORPHINE SULFATE 2 MILLIGRAM(S): 50 CAPSULE, EXTENDED RELEASE ORAL at 22:12

## 2019-07-13 RX ADMIN — TRAMADOL HYDROCHLORIDE 50 MILLIGRAM(S): 50 TABLET ORAL at 17:53

## 2019-07-13 RX ADMIN — Medication 650 MILLIGRAM(S): at 13:00

## 2019-07-13 RX ADMIN — Medication 650 MILLIGRAM(S): at 05:01

## 2019-07-13 NOTE — PROGRESS NOTE ADULT - ATTENDING COMMENTS
seen and examined 07-13-19 @ 0950    S2-3 minimally displaced sacral fractures  -sacral X-ray is stable after ambulating at home  -WBAT  -PT consult  -Tylenol and oxycodone PRN pain    small presacral hematoma  acute blood loss anemia  -transfused 1u RBC overnight (hgb 6.3 -> 8.5)  -given stable VS, no evidence of ongoing bleeding  -continue close hemodynamic monitoring in stepdown  -repeat CBC daily  -start chemical VTE prophylaxis if hgb remains stable tomorrow  -hold ibuprofen    arrythmia  -closer review of bedside monitor appears to demonstrate frequent PAC's, not Afib. seen and examined 07-13-19 @ 0950    S2-3 minimally displaced sacral fractures  -sacral X-ray is stable after ambulating at home  -WBAT  -PT consult  -Tylenol and oxycodone PRN pain    small presacral hematoma  acute blood loss anemia  -transfused 1u RBC overnight (hgb 6.3 -> 8.5)  -given stable VS, no evidence of ongoing bleeding  -continue close hemodynamic monitoring in stepdown  -repeat CBC daily  -start chemical VTE prophylaxis if hgb remains stable tomorrow    osteoarthritis  -hold ibuprofen in the setting of acute blood loss anemia from pelvic hematoma    chronic microcytic anemia  -check MCV    arrythmia  -closer review of bedside monitor appears to demonstrate frequent PAC's, not Afib seen and examined 07-13-19 @ 0950    S2-3 minimally displaced sacral fractures  -sacral X-ray is stable after ambulating at home  -WBAT  -PT consult  -Tylenol and oxycodone PRN pain    small presacral hematoma  acute blood loss anemia  -transfused 1u RBC overnight (hgb 6.3 -> 8.5)  -given stable VS, no evidence of ongoing bleeding  -continue close hemodynamic monitoring in stepdown  -repeat CBC daily  -start chemical VTE prophylaxis if hgb remains stable tomorrow    osteoarthritis  -hold ibuprofen in the setting of acute blood loss anemia from pelvic hematoma    chronic microcytic anemia  -check iron studies    arrythmia  -closer review of bedside monitor appears to demonstrate frequent PAC's, not Afib

## 2019-07-13 NOTE — CONSULT NOTE ADULT - SUBJECTIVE AND OBJECTIVE BOX
c/c: fall, back pain    HPI: 88F, pmh of HTn, HLD, Osteoporosis, Osteoarthritis, left eye blindness, urinary incontinence, glaucoma who presented to the ER with back pain. She had fallen  after slipping from her chair while working,  seen in ed and was able to ambulate. Preliminary imaging was negative and she was discharged home. She was called later that upon further review she was noted to have a sacral fracture. The following day, her pain worsened and was unable to get out of bed on her own so she returned to Ed. She was admitted to trauma. Transfused prbcs for acute blood loss anemia from presacral hematoma.  Hospitalist service now consulted for medical comanagement. Pt seen and examined in Stepdown. c/o back pain. not sure what pain meds are ordered but would like something now. Hasnt' been out of bed yet. no sob/chest pain. no n/v/d/abd pain. no f/c/r. no dysuria/frequency.     ROS: all 10 systems reviewed and is as above otherwise negative.     PMH: as above  PSH: b/l hip replacement, right wrist surgery  F/H: denies significant medical conditions in immediate family members  Social: no tobacco use, has a glass of wine daily  Allergies; NKDA  Home meds: see med rec    Vital Signs Last 24 Hrs  T(C): 36.4 (2019 08:19), Max: 37.2 (2019 23:45)  T(F): 97.5 (2019 08:19), Max: 98.9 (2019 23:45)  HR: 82 (2019 16:00) (66 - 94)  BP: 93/55 (2019 16:00) (84/52 - 122/65)  BP(mean): 63 (2019 16:00) (53 - 79)  RR: 16 (2019 16:00) (12 - 24)  SpO2: 94% (2019 16:00) (92% - 100%)    PHYSICAL EXAM:    GENERAL: Elderly female, thin appearing,  Comfortable, no acute distress   HEAD:  Normocephalic, atraumatic  EYES: Left eye blindness. Right eye-eomi  HEENT: Moist mucous membranes  NECK: Supple, No JVD  NERVOUS SYSTEM:  Alert & Oriented X3, non focal  CHEST/LUNG: Clear to auscultation bilaterally  HEART: Regular rate and rhythm  ABDOMEN: Soft, Nontender, Nondistended, Bowel sounds present  GENITOURINARY: Voiding, no palpable bladder  EXTREMITIES:   No clubbing, cyanosis, or edema  MUSCULOSKELETAL- pain with movement lower back. +OA b/l  hands  SKIN-no rash    LABS:                        8.5    9.47  )-----------( 211      ( 2019 10:38 )             27.7     07-    136  |  104  |  18  ----------------------------<  123<H>  3.8   |  22  |  0.46<L>    Ca    8.1<L>      2019 19:31    TPro  5.4<L>  /  Alb  2.7<L>  /  TBili  0.4  /  DBili  x   /  AST  23  /  ALT  27  /  AlkPhos  91  0712    PT/INR - ( 2019 19:31 )   PT: 11.6 sec;   INR: 1.04 ratio         PTT - ( 2019 19:31 )  PTT:26.5 sec  Urinalysis Basic - ( 2019 22:11 )    Color: Yellow / Appearance: Clear / S.015 / pH: x  Gluc: x / Ketone: Trace  / Bili: Negative / Urobili: Negative mg/dL   Blood: x / Protein: Negative mg/dL / Nitrite: Negative   Leuk Esterase: Small / RBC: 25-50 /HPF / WBC >50   Sq Epi: x / Non Sq Epi: Moderate / Bacteria: Moderate    CT Cervical Spine No Cont (19 @ 17:27) >  IMPRESSION:   No vertebral fracture is recognized.  Multilevel   degenerative changes of the cervical spine are present.    CT Pelvis Bony Only No Cont (19 @ 17:26) >  IMPRESSION:   Minimally displaced transversely oriented fracture of the sacrum at the   S2-3 level with adjacent muscular edema and hematoma.    This represents a change in interpretation from the preliminary report.   This was communicated electronically with the emergency room providers as   per departmental protocol.    MEDICATIONS  (STANDING):  acetaminophen   Tablet .. 650 milliGRAM(s) Oral every 8 hours  atorvastatin 10 milliGRAM(s) Oral daily  docusate sodium 100 milliGRAM(s) Oral two times a day  latanoprost 0.005% Ophthalmic Solution 1 Drop(s) Right EYE at bedtime  losartan 50 milliGRAM(s) Oral daily    MEDICATIONS  (PRN):  bisacodyl 5 milliGRAM(s) Oral daily PRN Constipation  traMADol 25 milliGRAM(s) Oral every 6 hours PRN Moderate Pain (4 - 6)  traMADol 50 milliGRAM(s) Oral every 6 hours PRN Severe Pain (7 - 10)    ASSESSMENT AND PLAN:  88f, PMH as above a/w:    1. Mechanical fall, sacral fracture/hematoma, inability to ambulate:  -try tramadol for pain control  -seen by ortho, wbat  -physical therapy evaluation  -monitor h/h  -incentive spirometry  -bowel regimen    2. Acute blood loss anemia:  -due to presacral hematoma.  -tranfused prbcs  -monitor h/h    3. H/o HTN:  -bp borderline  -hold arb for now.    4. HLD:  -statin    5. DVT px.   -start once h/h stabilized    thank you, will follow  d/w rn

## 2019-07-13 NOTE — PROGRESS NOTE ADULT - ASSESSMENT
89 yo F presenting to ED s/p fall from standing. She sustained S2-3 minimally displaced sacral fractures    Plan  Sacral fractures  - appreciate ortho spine consult: WBAT  -Tylenol and oxycodone PRN pain  -PT consult, assistance with mobilization    small presacral hematoma  -admit to stepdown for close hemodynamic monitoring  -no evidence of ongoing hemorrhage based on stable VS and no metabolic acidosis  -s/p 1u pRBC. repeat CBC this morning  -start chemical VTE prophylaxis if hgb remains stable   -hold ibuprofen    new onset afib  -not currently a candidate for therapeutic anticoagulation given pelvic hematoma  -f/u with PMD    Plan discussed with Dr. Cintron

## 2019-07-13 NOTE — PATIENT PROFILE ADULT - VISION (WITH CORRECTIVE LENSES IF THE PATIENT USUALLY WEARS THEM):
left eye blind/Partially impaired: cannot see medication labels or newsprint, but can see obstacles in path, and the surrounding layout; can count fingers at arm's length

## 2019-07-13 NOTE — PROGRESS NOTE ADULT - SUBJECTIVE AND OBJECTIVE BOX
Patient was examined this AM. She was resting in bed, complained of some buttock discomfort from laying down in one position. States she feels too much pain to ambulate. Denies f/c/n/v/cp/sob/d/c    Vital Signs Last 24 Hrs  T(C): 36.4 (13 Jul 2019 08:19), Max: 37.2 (12 Jul 2019 23:45)  T(F): 97.5 (13 Jul 2019 08:19), Max: 98.9 (12 Jul 2019 23:45)  HR: 79 (13 Jul 2019 10:00) (69 - 94)  BP: 93/50 (13 Jul 2019 10:00) (84/52 - 122/65)  BP(mean): 60 (13 Jul 2019 10:00) (57 - 79)  RR: 22 (13 Jul 2019 10:00) (12 - 24)  SpO2: 97% (13 Jul 2019 09:00) (92% - 100%)    Gen; NAD, AAOx3  CV: irregularly irregular  Pulm; CTAB, no wheezing  Abd: soft, non distended, non tender  Ext: sacral ttp, limiting active and passive lower extremity ROM due to pain, well perfused                          8.5    9.47  )-----------( 211      ( 13 Jul 2019 10:38 )             27.7   07-12    136  |  104  |  18  ----------------------------<  123<H>  3.8   |  22  |  0.46<L>    Ca    8.1<L>      12 Jul 2019 19:31    TPro  5.4<L>  /  Alb  2.7<L>  /  TBili  0.4  /  DBili  x   /  AST  23  /  ALT  27  /  AlkPhos  91  07-12

## 2019-07-14 LAB
ANION GAP SERPL CALC-SCNC: 6 MMOL/L — SIGNIFICANT CHANGE UP (ref 5–17)
BASOPHILS # BLD AUTO: 0.02 K/UL — SIGNIFICANT CHANGE UP (ref 0–0.2)
BASOPHILS NFR BLD AUTO: 0.3 % — SIGNIFICANT CHANGE UP (ref 0–2)
BUN SERPL-MCNC: 16 MG/DL — SIGNIFICANT CHANGE UP (ref 7–23)
CALCIUM SERPL-MCNC: 7.5 MG/DL — LOW (ref 8.5–10.1)
CHLORIDE SERPL-SCNC: 104 MMOL/L — SIGNIFICANT CHANGE UP (ref 96–108)
CO2 SERPL-SCNC: 24 MMOL/L — SIGNIFICANT CHANGE UP (ref 22–31)
CREAT SERPL-MCNC: 0.5 MG/DL — SIGNIFICANT CHANGE UP (ref 0.5–1.3)
EOSINOPHIL # BLD AUTO: 0.14 K/UL — SIGNIFICANT CHANGE UP (ref 0–0.5)
EOSINOPHIL NFR BLD AUTO: 1.9 % — SIGNIFICANT CHANGE UP (ref 0–6)
GLUCOSE SERPL-MCNC: 82 MG/DL — SIGNIFICANT CHANGE UP (ref 70–99)
HCT VFR BLD CALC: 29 % — LOW (ref 34.5–45)
HGB BLD-MCNC: 9.1 G/DL — LOW (ref 11.5–15.5)
IMM GRANULOCYTES NFR BLD AUTO: 0.5 % — SIGNIFICANT CHANGE UP (ref 0–1.5)
LYMPHOCYTES # BLD AUTO: 0.45 K/UL — LOW (ref 1–3.3)
LYMPHOCYTES # BLD AUTO: 6.1 % — LOW (ref 13–44)
MAGNESIUM SERPL-MCNC: 2.2 MG/DL — SIGNIFICANT CHANGE UP (ref 1.6–2.6)
MCHC RBC-ENTMCNC: 25.3 PG — LOW (ref 27–34)
MCHC RBC-ENTMCNC: 31.4 GM/DL — LOW (ref 32–36)
MCV RBC AUTO: 80.6 FL — SIGNIFICANT CHANGE UP (ref 80–100)
MONOCYTES # BLD AUTO: 0.9 K/UL — SIGNIFICANT CHANGE UP (ref 0–0.9)
MONOCYTES NFR BLD AUTO: 12.3 % — SIGNIFICANT CHANGE UP (ref 2–14)
NEUTROPHILS # BLD AUTO: 5.77 K/UL — SIGNIFICANT CHANGE UP (ref 1.8–7.4)
NEUTROPHILS NFR BLD AUTO: 78.9 % — HIGH (ref 43–77)
PHOSPHATE SERPL-MCNC: 3.9 MG/DL — SIGNIFICANT CHANGE UP (ref 2.5–4.5)
PLATELET # BLD AUTO: 164 K/UL — SIGNIFICANT CHANGE UP (ref 150–400)
POTASSIUM SERPL-MCNC: 4 MMOL/L — SIGNIFICANT CHANGE UP (ref 3.5–5.3)
POTASSIUM SERPL-SCNC: 4 MMOL/L — SIGNIFICANT CHANGE UP (ref 3.5–5.3)
RBC # BLD: 3.6 M/UL — LOW (ref 3.8–5.2)
RBC # FLD: 16.4 % — HIGH (ref 10.3–14.5)
SODIUM SERPL-SCNC: 134 MMOL/L — LOW (ref 135–145)
WBC # BLD: 7.32 K/UL — SIGNIFICANT CHANGE UP (ref 3.8–10.5)
WBC # FLD AUTO: 7.32 K/UL — SIGNIFICANT CHANGE UP (ref 3.8–10.5)

## 2019-07-14 PROCEDURE — 99233 SBSQ HOSP IP/OBS HIGH 50: CPT

## 2019-07-14 RX ORDER — MORPHINE SULFATE 50 MG/1
4 CAPSULE, EXTENDED RELEASE ORAL EVERY 4 HOURS
Refills: 0 | Status: DISCONTINUED | OUTPATIENT
Start: 2019-07-14 | End: 2019-07-21

## 2019-07-14 RX ORDER — PREGABALIN 225 MG/1
1000 CAPSULE ORAL DAILY
Refills: 0 | Status: DISCONTINUED | OUTPATIENT
Start: 2019-07-14 | End: 2019-07-22

## 2019-07-14 RX ORDER — ENOXAPARIN SODIUM 100 MG/ML
30 INJECTION SUBCUTANEOUS EVERY 12 HOURS
Refills: 0 | Status: DISCONTINUED | OUTPATIENT
Start: 2019-07-14 | End: 2019-07-22

## 2019-07-14 RX ORDER — TRAMADOL HYDROCHLORIDE 50 MG/1
50 TABLET ORAL EVERY 6 HOURS
Refills: 0 | Status: DISCONTINUED | OUTPATIENT
Start: 2019-07-14 | End: 2019-07-21

## 2019-07-14 RX ORDER — LIDOCAINE 4 G/100G
1 CREAM TOPICAL DAILY
Refills: 0 | Status: DISCONTINUED | OUTPATIENT
Start: 2019-07-14 | End: 2019-07-22

## 2019-07-14 RX ORDER — IBUPROFEN 200 MG
1 TABLET ORAL
Qty: 0 | Refills: 0 | DISCHARGE

## 2019-07-14 RX ORDER — ONDANSETRON 8 MG/1
4 TABLET, FILM COATED ORAL EVERY 6 HOURS
Refills: 0 | Status: DISCONTINUED | OUTPATIENT
Start: 2019-07-14 | End: 2019-07-22

## 2019-07-14 RX ORDER — IBUPROFEN 200 MG
800 TABLET ORAL THREE TIMES A DAY
Refills: 0 | Status: DISCONTINUED | OUTPATIENT
Start: 2019-07-14 | End: 2019-07-22

## 2019-07-14 RX ORDER — MORPHINE SULFATE 50 MG/1
4 CAPSULE, EXTENDED RELEASE ORAL EVERY 4 HOURS
Refills: 0 | Status: DISCONTINUED | OUTPATIENT
Start: 2019-07-14 | End: 2019-07-14

## 2019-07-14 RX ORDER — MORPHINE SULFATE 50 MG/1
2 CAPSULE, EXTENDED RELEASE ORAL ONCE
Refills: 0 | Status: DISCONTINUED | OUTPATIENT
Start: 2019-07-14 | End: 2019-07-14

## 2019-07-14 RX ADMIN — LATANOPROST 1 DROP(S): 0.05 SOLUTION/ DROPS OPHTHALMIC; TOPICAL at 20:41

## 2019-07-14 RX ADMIN — Medication 800 MILLIGRAM(S): at 13:01

## 2019-07-14 RX ADMIN — Medication 650 MILLIGRAM(S): at 06:00

## 2019-07-14 RX ADMIN — ONDANSETRON 4 MILLIGRAM(S): 8 TABLET, FILM COATED ORAL at 16:46

## 2019-07-14 RX ADMIN — TRAMADOL HYDROCHLORIDE 50 MILLIGRAM(S): 50 TABLET ORAL at 20:41

## 2019-07-14 RX ADMIN — MORPHINE SULFATE 2 MILLIGRAM(S): 50 CAPSULE, EXTENDED RELEASE ORAL at 11:42

## 2019-07-14 RX ADMIN — MORPHINE SULFATE 2 MILLIGRAM(S): 50 CAPSULE, EXTENDED RELEASE ORAL at 06:00

## 2019-07-14 RX ADMIN — ATORVASTATIN CALCIUM 10 MILLIGRAM(S): 80 TABLET, FILM COATED ORAL at 13:01

## 2019-07-14 RX ADMIN — MORPHINE SULFATE 2 MILLIGRAM(S): 50 CAPSULE, EXTENDED RELEASE ORAL at 10:13

## 2019-07-14 RX ADMIN — ENOXAPARIN SODIUM 30 MILLIGRAM(S): 100 INJECTION SUBCUTANEOUS at 18:49

## 2019-07-14 RX ADMIN — Medication 800 MILLIGRAM(S): at 21:30

## 2019-07-14 RX ADMIN — LIDOCAINE 1 PATCH: 4 CREAM TOPICAL at 13:00

## 2019-07-14 RX ADMIN — Medication 800 MILLIGRAM(S): at 13:14

## 2019-07-14 RX ADMIN — LIDOCAINE 1 PATCH: 4 CREAM TOPICAL at 18:37

## 2019-07-14 RX ADMIN — Medication 100 MILLIGRAM(S): at 05:30

## 2019-07-14 RX ADMIN — MORPHINE SULFATE 2 MILLIGRAM(S): 50 CAPSULE, EXTENDED RELEASE ORAL at 12:12

## 2019-07-14 RX ADMIN — MORPHINE SULFATE 2 MILLIGRAM(S): 50 CAPSULE, EXTENDED RELEASE ORAL at 10:43

## 2019-07-14 RX ADMIN — MORPHINE SULFATE 2 MILLIGRAM(S): 50 CAPSULE, EXTENDED RELEASE ORAL at 05:29

## 2019-07-14 RX ADMIN — TRAMADOL HYDROCHLORIDE 50 MILLIGRAM(S): 50 TABLET ORAL at 21:30

## 2019-07-14 RX ADMIN — Medication 800 MILLIGRAM(S): at 20:41

## 2019-07-14 RX ADMIN — Medication 650 MILLIGRAM(S): at 05:29

## 2019-07-14 NOTE — PROGRESS NOTE ADULT - ASSESSMENT
89 yo F presenting to ED s/p fall from standing. She sustained S2-3 minimally displaced sacral fractures    Plan  Sacral fractures  - appreciate ortho spine consult: WBAT  -Tylenol and oxycodone, morphine PRN pain  -PT consult, assistance with mobilization    small presacral hematoma  -admit to stepdown for close hemodynamic monitoring  -no evidence of ongoing hemorrhage based on stable VS and no metabolic acidosis  -s/p 1u pRBC. repeat CBC this morning is stable  -will restart chemical VTE prophylaxis        new onset afib  -not currently a candidate for therapeutic anticoagulation given pelvic hematoma  -f/u with PMD    Plan discussed with Dr. Cintron

## 2019-07-14 NOTE — PROGRESS NOTE ADULT - ATTENDING COMMENTS
seen and examined 07-14-19 @ 0715    S2-3 minimally displaced sacral fractures  -WBAT  -uses rolling walker at home  -PT consult  -pain is better controlled with Tylenol and morphine PRN    small presacral hematoma  acute blood loss anemia  -given stable VS, no evidence of ongoing bleeding, so will start chemical VTE prophylaxis  -continue close hemodynamic monitoring in stepdown  -repeat CBC daily    osteoarthritis  -restart ibuprofen since no evidence of ongoing bleeding from sacral fracture    chronic microcytic anemia  -iron studies are normal    arrythmia  -remains in NSR today

## 2019-07-14 NOTE — PROGRESS NOTE ADULT - SUBJECTIVE AND OBJECTIVE BOX
Patient was examined this AM. She was resting in bed, complained of some buttock discomfort from laying down in one position. States she feels too much pain to ambulate. Denies f/c/n/v/cp/sob/d/c    Vital Signs Last 24 Hrs  T(C): 36.9 (14 Jul 2019 09:13), Max: 36.9 (13 Jul 2019 16:49)  T(F): 98.5 (14 Jul 2019 09:13), Max: 98.5 (14 Jul 2019 09:13)  HR: 98 (14 Jul 2019 09:00) (66 - 101)  BP: 155/24 (14 Jul 2019 09:00) (86/42 - 168/105)  BP(mean): 60 (14 Jul 2019 09:00) (53 - 120)  RR: 10 (14 Jul 2019 09:00) (10 - 29)  SpO2: 95% (14 Jul 2019 06:00) (92% - 98%)    Gen; NAD, AAOx3  CV: irregularly irregular  Pulm; CTAB, no wheezing  Abd: soft, non distended, non tender  Ext: sacral ttp, limiting active and passive lower extremity ROM due to pain, well perfused                                     9.1    7.32  )-----------( 164      ( 14 Jul 2019 06:32 )             29.0   07-14    134<L>  |  104  |  16  ----------------------------<  82  4.0   |  24  |  0.50    Ca    7.5<L>      14 Jul 2019 06:32  Phos  3.9     07-14  Mg     2.2     07-14    TPro  5.4<L>  /  Alb  2.7<L>  /  TBili  0.4  /  DBili  x   /  AST  23  /  ALT  27  /  AlkPhos  91  07-12

## 2019-07-14 NOTE — PROGRESS NOTE ADULT - SUBJECTIVE AND OBJECTIVE BOX
c/c: fall, back pain    HPI: 88F, pmh of HTn, HLD, Osteoporosis, Osteoarthritis, left eye blindness, urinary incontinence, glaucoma who presented to the ER with back pain. She had fallen  after slipping from her chair while working,  seen in ed and was able to ambulate. Preliminary imaging was negative and she was discharged home. She was called later that upon further review she was noted to have a sacral fracture. The following day, her pain worsened and was unable to get out of bed on her own so she returned to Ed. She was admitted to trauma. Transfused prbcs for acute blood loss anemia from presacral hematoma.  Hospitalist service now consulted for medical comanagement.    7/15: pt seen and examined this am. C/o lower back pain. Unable to get out of bed d/t pain. Not much relief with iv morphine. No sob/chest pain.     ROS: all 10 systems reviewed and is as above otherwise negative.     Vital Signs Last 24 Hrs  T(C): 36.9 (2019 09:13), Max: 36.9 (2019 16:49)  T(F): 98.5 (2019 09:13), Max: 98.5 (2019 09:13)  HR: 97 (2019 13:00) (68 - 101)  BP: 113/57 (2019 13:00) (86/43 - 168/105)  BP(mean): 71 (2019 13:00) (54 - 120)  RR: 13 (2019 13:00) (10 - 29)  SpO2: 95% (2019 06:00) (92% - 98%)    PHYSICAL EXAM:    GENERAL: Elderly female, thin appearing,  Comfortable, no acute distress   HEAD:  Normocephalic, atraumatic  EYES: Left eye blindness. Right eye-eomi  HEENT: Moist mucous membranes  NECK: Supple, No JVD  NERVOUS SYSTEM:  Alert & Oriented X3, non focal  CHEST/LUNG: Clear to auscultation bilaterally  HEART: Regular rate and rhythm  ABDOMEN: Soft, Nontender, Nondistended, Bowel sounds present  GENITOURINARY: Voiding, no palpable bladder  EXTREMITIES:   No clubbing, cyanosis, or edema  MUSCULOSKELETAL- . +OA b/l  hands  SKIN-no rash  LABS:                        9.1    7.32  )-----------( 164      ( 2019 06:32 )             29.0     07-14    134<L>  |  104  |  16  ----------------------------<  82  4.0   |  24  |  0.50    Ca    7.5<L>      2019 06:32  Phos  3.9     07-14  Mg     2.2     07-14    TPro  5.4<L>  /  Alb  2.7<L>  /  TBili  0.4  /  DBili  x   /  AST  23  /  ALT  27  /  AlkPhos  91  07-12    PT/INR - ( 2019 19:31 )   PT: 11.6 sec;   INR: 1.04 ratio         PTT - ( 2019 19:31 )  PTT:26.5 sec  Urinalysis Basic - ( 2019 22:11 )    Color: Yellow / Appearance: Clear / S.015 / pH: x  Gluc: x / Ketone: Trace  / Bili: Negative / Urobili: Negative mg/dL   Blood: x / Protein: Negative mg/dL / Nitrite: Negative   Leuk Esterase: Small / RBC: 25-50 /HPF / WBC >50   Sq Epi: x / Non Sq Epi: Moderate / Bacteria: Moderate    CT Cervical Spine No Cont (19 @ 17:27) >  IMPRESSION:   No vertebral fracture is recognized.  Multilevel   degenerative changes of the cervical spine are present.    CT Pelvis Bony Only No Cont (19 @ 17:26) >  IMPRESSION:   Minimally displaced transversely oriented fracture of the sacrum at the   S2-3 level with adjacent muscular edema and hematoma.    This represents a change in interpretation from the preliminary report.   This was communicated electronically with the emergency room providers as   per departmental protocol.    MEDICATIONS  (STANDING):  acetaminophen   Tablet .. 650 milliGRAM(s) Oral every 8 hours  atorvastatin 10 milliGRAM(s) Oral daily  docusate sodium 100 milliGRAM(s) Oral two times a day  enoxaparin Injectable 30 milliGRAM(s) SubCutaneous every 12 hours  ibuprofen  Tablet. 800 milliGRAM(s) Oral three times a day  latanoprost 0.005% Ophthalmic Solution 1 Drop(s) Right EYE at bedtime  lidocaine   Patch 1 Patch Transdermal daily    MEDICATIONS  (PRN):  bisacodyl 5 milliGRAM(s) Oral daily PRN Constipation  morphine  - Injectable 4 milliGRAM(s) IV Push every 4 hours PRN Severe Pain (7 - 10)  traMADol 25 milliGRAM(s) Oral every 6 hours PRN Moderate Pain (4 - 6)  traMADol 50 milliGRAM(s) Oral every 6 hours PRN Severe Pain (7 - 10)      ASSESSMENT AND PLAN:  88f, PMH as above a/w:    1. Mechanical fall, sacral fracture/hematoma, inability to ambulate:  -increase morphine to 4mg for uncontrolled pain, will change to sq.  -try tramadol for pain control  -seen by ortho, wbat  -physical therapy evaluation  -monitor h/h  -incentive spirometry  -bowel regimen    2. Acute blood loss anemia:  -due to presacral hematoma.  -tranfused prbcs  -monitor h/h    3. H/o HTN:  -bp borderline  -continue to hold arb for now.    4 PAF:  -new onset  -check echo  -consult cardio  -hold off on a/c for now-->in sinus.     5. HLD:  -statin    6. DVT px.       d/w rn c/c: fall, back pain    HPI: 88F, pmh of HTn, HLD, Osteoporosis, Osteoarthritis, left eye blindness, urinary incontinence, glaucoma who presented to the ER with back pain. She had fallen  after slipping from her chair while working,  seen in ed and was able to ambulate. Preliminary imaging was negative and she was discharged home. She was called later that upon further review she was noted to have a sacral fracture. The following day, her pain worsened and was unable to get out of bed on her own so she returned to Ed. She was admitted to trauma. Transfused prbcs for acute blood loss anemia from presacral hematoma.  Hospitalist service now consulted for medical comanagement.    7/15: pt seen and examined this am. C/o lower back pain. Unable to get out of bed d/t pain. Not much relief with iv morphine. No sob/chest pain.     ROS: all 10 systems reviewed and is as above otherwise negative.     Vital Signs Last 24 Hrs  T(C): 36.9 (2019 09:13), Max: 36.9 (2019 16:49)  T(F): 98.5 (2019 09:13), Max: 98.5 (2019 09:13)  HR: 97 (2019 13:00) (68 - 101)  BP: 113/57 (2019 13:00) (86/43 - 168/105)  BP(mean): 71 (2019 13:00) (54 - 120)  RR: 13 (2019 13:00) (10 - 29)  SpO2: 95% (2019 06:00) (92% - 98%)    PHYSICAL EXAM:    GENERAL: Elderly female, thin appearing,  Comfortable, no acute distress   HEAD:  Normocephalic, atraumatic  EYES: Left eye blindness. Right eye-eomi  HEENT: Moist mucous membranes  NECK: Supple, No JVD  NERVOUS SYSTEM:  Alert & Oriented X3, non focal  CHEST/LUNG: Clear to auscultation bilaterally  HEART: Regular rate and rhythm  ABDOMEN: Soft, Nontender, Nondistended, Bowel sounds present  GENITOURINARY: Voiding, no palpable bladder  EXTREMITIES:   No clubbing, cyanosis, or edema  MUSCULOSKELETAL- . +OA b/l  hands  SKIN-no rash  LABS:                        9.1    7.32  )-----------( 164      ( 2019 06:32 )             29.0     07-14    134<L>  |  104  |  16  ----------------------------<  82  4.0   |  24  |  0.50    Ca    7.5<L>      2019 06:32  Phos  3.9     07-14  Mg     2.2     07-14    TPro  5.4<L>  /  Alb  2.7<L>  /  TBili  0.4  /  DBili  x   /  AST  23  /  ALT  27  /  AlkPhos  91  07-12    PT/INR - ( 2019 19:31 )   PT: 11.6 sec;   INR: 1.04 ratio         PTT - ( 2019 19:31 )  PTT:26.5 sec  Urinalysis Basic - ( 2019 22:11 )    Color: Yellow / Appearance: Clear / S.015 / pH: x  Gluc: x / Ketone: Trace  / Bili: Negative / Urobili: Negative mg/dL   Blood: x / Protein: Negative mg/dL / Nitrite: Negative   Leuk Esterase: Small / RBC: 25-50 /HPF / WBC >50   Sq Epi: x / Non Sq Epi: Moderate / Bacteria: Moderate    CT Cervical Spine No Cont (19 @ 17:27) >  IMPRESSION:   No vertebral fracture is recognized.  Multilevel   degenerative changes of the cervical spine are present.    CT Pelvis Bony Only No Cont (19 @ 17:26) >  IMPRESSION:   Minimally displaced transversely oriented fracture of the sacrum at the   S2-3 level with adjacent muscular edema and hematoma.    This represents a change in interpretation from the preliminary report.   This was communicated electronically with the emergency room providers as   per departmental protocol.    MEDICATIONS  (STANDING):  acetaminophen   Tablet .. 650 milliGRAM(s) Oral every 8 hours  atorvastatin 10 milliGRAM(s) Oral daily  docusate sodium 100 milliGRAM(s) Oral two times a day  enoxaparin Injectable 30 milliGRAM(s) SubCutaneous every 12 hours  ibuprofen  Tablet. 800 milliGRAM(s) Oral three times a day  latanoprost 0.005% Ophthalmic Solution 1 Drop(s) Right EYE at bedtime  lidocaine   Patch 1 Patch Transdermal daily    MEDICATIONS  (PRN):  bisacodyl 5 milliGRAM(s) Oral daily PRN Constipation  morphine  - Injectable 4 milliGRAM(s) IV Push every 4 hours PRN Severe Pain (7 - 10)  traMADol 25 milliGRAM(s) Oral every 6 hours PRN Moderate Pain (4 - 6)  traMADol 50 milliGRAM(s) Oral every 6 hours PRN Severe Pain (7 - 10)      ASSESSMENT AND PLAN:  88f, PMH as above a/w:    1. Mechanical fall, sacral fracture/hematoma, inability to ambulate:  -increase morphine to 4mg for uncontrolled pain, will change to sq.  -tramadol for moderate pain  -add lidoderm patch  -seen by ortho, wbat  -physical therapy evaluation  -monitor h/h  -incentive spirometry  -bowel regimen    2. Acute blood loss anemia:  -due to presacral hematoma.  -tranfused prbcs  -monitor h/h    3. H/o HTN:  -bp borderline  -continue to hold arb for now.    4 PAF:  -new onset  -check echo  -consult cardio  -hold off on a/c for now-->in sinus.     5. HLD:  -statin    6. DVT px.       d/w rn

## 2019-07-15 LAB
HCT VFR BLD CALC: 29.7 % — LOW (ref 34.5–45)
HGB BLD-MCNC: 9.1 G/DL — LOW (ref 11.5–15.5)
MCHC RBC-ENTMCNC: 24.7 PG — LOW (ref 27–34)
MCHC RBC-ENTMCNC: 30.6 GM/DL — LOW (ref 32–36)
MCV RBC AUTO: 80.5 FL — SIGNIFICANT CHANGE UP (ref 80–100)
PLATELET # BLD AUTO: 223 K/UL — SIGNIFICANT CHANGE UP (ref 150–400)
RBC # BLD: 3.69 M/UL — LOW (ref 3.8–5.2)
RBC # FLD: 16.6 % — HIGH (ref 10.3–14.5)
WBC # BLD: 8.92 K/UL — SIGNIFICANT CHANGE UP (ref 3.8–10.5)
WBC # FLD AUTO: 8.92 K/UL — SIGNIFICANT CHANGE UP (ref 3.8–10.5)

## 2019-07-15 PROCEDURE — 99232 SBSQ HOSP IP/OBS MODERATE 35: CPT

## 2019-07-15 PROCEDURE — 93306 TTE W/DOPPLER COMPLETE: CPT | Mod: 26

## 2019-07-15 RX ORDER — SENNA PLUS 8.6 MG/1
2 TABLET ORAL AT BEDTIME
Refills: 0 | Status: DISCONTINUED | OUTPATIENT
Start: 2019-07-15 | End: 2019-07-22

## 2019-07-15 RX ORDER — SODIUM CHLORIDE 9 MG/ML
1000 INJECTION INTRAMUSCULAR; INTRAVENOUS; SUBCUTANEOUS
Refills: 0 | Status: DISCONTINUED | OUTPATIENT
Start: 2019-07-15 | End: 2019-07-17

## 2019-07-15 RX ORDER — POLYETHYLENE GLYCOL 3350 17 G/17G
17 POWDER, FOR SOLUTION ORAL DAILY
Refills: 0 | Status: DISCONTINUED | OUTPATIENT
Start: 2019-07-15 | End: 2019-07-22

## 2019-07-15 RX ADMIN — ATORVASTATIN CALCIUM 10 MILLIGRAM(S): 80 TABLET, FILM COATED ORAL at 13:58

## 2019-07-15 RX ADMIN — TRAMADOL HYDROCHLORIDE 50 MILLIGRAM(S): 50 TABLET ORAL at 17:30

## 2019-07-15 RX ADMIN — LATANOPROST 1 DROP(S): 0.05 SOLUTION/ DROPS OPHTHALMIC; TOPICAL at 21:20

## 2019-07-15 RX ADMIN — Medication 650 MILLIGRAM(S): at 21:19

## 2019-07-15 RX ADMIN — Medication 100 MILLIGRAM(S): at 17:31

## 2019-07-15 RX ADMIN — TRAMADOL HYDROCHLORIDE 50 MILLIGRAM(S): 50 TABLET ORAL at 13:04

## 2019-07-15 RX ADMIN — TRAMADOL HYDROCHLORIDE 50 MILLIGRAM(S): 50 TABLET ORAL at 03:40

## 2019-07-15 RX ADMIN — Medication 650 MILLIGRAM(S): at 23:00

## 2019-07-15 RX ADMIN — PREGABALIN 1000 MICROGRAM(S): 225 CAPSULE ORAL at 13:58

## 2019-07-15 RX ADMIN — Medication 650 MILLIGRAM(S): at 04:41

## 2019-07-15 RX ADMIN — TRAMADOL HYDROCHLORIDE 50 MILLIGRAM(S): 50 TABLET ORAL at 04:15

## 2019-07-15 RX ADMIN — Medication 800 MILLIGRAM(S): at 05:20

## 2019-07-15 RX ADMIN — LIDOCAINE 1 PATCH: 4 CREAM TOPICAL at 13:59

## 2019-07-15 RX ADMIN — LIDOCAINE 1 PATCH: 4 CREAM TOPICAL at 19:17

## 2019-07-15 RX ADMIN — Medication 800 MILLIGRAM(S): at 04:41

## 2019-07-15 RX ADMIN — POLYETHYLENE GLYCOL 3350 17 GRAM(S): 17 POWDER, FOR SOLUTION ORAL at 17:31

## 2019-07-15 RX ADMIN — Medication 800 MILLIGRAM(S): at 21:18

## 2019-07-15 RX ADMIN — Medication 100 MILLIGRAM(S): at 04:40

## 2019-07-15 RX ADMIN — SENNA PLUS 2 TABLET(S): 8.6 TABLET ORAL at 21:18

## 2019-07-15 RX ADMIN — LIDOCAINE 1 PATCH: 4 CREAM TOPICAL at 01:00

## 2019-07-15 RX ADMIN — Medication 800 MILLIGRAM(S): at 14:18

## 2019-07-15 RX ADMIN — ENOXAPARIN SODIUM 30 MILLIGRAM(S): 100 INJECTION SUBCUTANEOUS at 17:31

## 2019-07-15 RX ADMIN — TRAMADOL HYDROCHLORIDE 50 MILLIGRAM(S): 50 TABLET ORAL at 12:04

## 2019-07-15 RX ADMIN — Medication 1 TABLET(S): at 13:58

## 2019-07-15 RX ADMIN — Medication 800 MILLIGRAM(S): at 13:59

## 2019-07-15 RX ADMIN — SODIUM CHLORIDE 100 MILLILITER(S): 9 INJECTION INTRAMUSCULAR; INTRAVENOUS; SUBCUTANEOUS at 20:21

## 2019-07-15 RX ADMIN — Medication 650 MILLIGRAM(S): at 05:20

## 2019-07-15 RX ADMIN — ENOXAPARIN SODIUM 30 MILLIGRAM(S): 100 INJECTION SUBCUTANEOUS at 04:42

## 2019-07-15 NOTE — CONSULT NOTE ADULT - REASON FOR ADMISSION
sacral fracture with pelvic hematoma

## 2019-07-15 NOTE — CONSULT NOTE ADULT - ASSESSMENT
88 year old female  S/P fall with sacral Fx with surrounding hematoma  On Atrial fibrillation, rate controlled  No anticoagulation at present  Pain managment  Will follow

## 2019-07-15 NOTE — PHYSICAL THERAPY INITIAL EVALUATION ADULT - PERTINENT HX OF CURRENT PROBLEM, REHAB EVAL
88F, pmh of HTn, HLD, Osteoporosis, Osteoarthritis, left eye blindness, urinary incontinence, glaucoma who presented to the ER with back pain. She had fallen 7/11 after slipping from her chair while working,  seen in ed and was able to ambulate. Preliminary imaging was negative and she was discharged home. She was called later that upon further review she was noted to have a sacral fracture. The following day, her pain worsened and was unable to get out of bed on her own so she returned to Ed.

## 2019-07-15 NOTE — PROGRESS NOTE ADULT - SUBJECTIVE AND OBJECTIVE BOX
c/c: fall, back pain    HPI: 88F, pmh of HTn, HLD, Osteoporosis, Osteoarthritis, left eye blindness, urinary incontinence, glaucoma who presented to the ER with back pain. She had fallen 7/11 after slipping from her chair while working,  seen in ed and was able to ambulate. Preliminary imaging was negative and she was discharged home. She was called later that upon further review she was noted to have a sacral fracture. The following day, her pain worsened and was unable to get out of bed on her own so she returned to Ed. She was admitted to trauma. Transfused prbcs for acute blood loss anemia from presacral hematoma.  Hospitalist service now consulted for medical comanagement.    7/16: pt seen and examined this am. Doing much better with increase in morphine dose/change to sq. Motivated to work with physical therapy. No acute overnight events. Urine dark in color. Also required straight cath overnight.    ROS: all 10 systems reviewed and is as above otherwise negative.     Vital Signs Last 24 Hrs  T(C): 36.5 (15 Jul 2019 09:36), Max: 36.6 (14 Jul 2019 16:26)  T(F): 97.7 (15 Jul 2019 09:36), Max: 97.9 (14 Jul 2019 20:59)  HR: 81 (15 Jul 2019 14:00) (66 - 113)  BP: 101/56 (15 Jul 2019 14:00) (100/60 - 130/59)  BP(mean): 68 (15 Jul 2019 14:00) (65 - 81)  RR: 11 (15 Jul 2019 14:00) (11 - 27)  SpO2: 94% (15 Jul 2019 14:00) (92% - 98%)    PHYSICAL EXAM:    GENERAL: Elderly female, thin appearing,  Comfortable, no acute distress   HEAD:  Normocephalic, atraumatic  EYES: Left eye blindness. Right eye-eomi  HEENT: Moist mucous membranes  NECK: Supple, No JVD  NERVOUS SYSTEM:  Alert & Oriented X3, non focal  CHEST/LUNG: Clear to auscultation bilaterally  HEART: Regular rate and rhythm  ABDOMEN: Soft, Nontender, Nondistended, Bowel sounds present  GENITOURINARY: Voiding, no palpable bladder  EXTREMITIES:   No clubbing, cyanosis, or edema  MUSCULOSKELETAL- . +OA b/l  hands  SKIN-no rash    LABS:                        9.1    8.92  )-----------( 223      ( 15 Jul 2019 06:33 )             29.7     07-14    134<L>  |  104  |  16  ----------------------------<  82  4.0   |  24  |  0.50    Ca    7.5<L>      14 Jul 2019 06:32  Phos  3.9     07-14  Mg     2.2     07-14        CT Cervical Spine No Cont (07.11.19 @ 17:27) >  IMPRESSION:   No vertebral fracture is recognized.  Multilevel   degenerative changes of the cervical spine are present.    CT Pelvis Bony Only No Cont (07.11.19 @ 17:26) >  IMPRESSION:   Minimally displaced transversely oriented fracture of the sacrum at the   S2-3 level with adjacent muscular edema and hematoma.    This represents a change in interpretation from the preliminary report.   This was communicated electronically with the emergency room providers as   per departmental protocol.    MEDICATIONS  (STANDING):  acetaminophen   Tablet .. 650 milliGRAM(s) Oral every 8 hours  atorvastatin 10 milliGRAM(s) Oral daily  calcium carbonate 1250 mG  + Vitamin D (OsCal 500 + D) 1 Tablet(s) Oral daily  cyanocobalamin 1000 MICROGram(s) Oral daily  docusate sodium 100 milliGRAM(s) Oral two times a day  enoxaparin Injectable 30 milliGRAM(s) SubCutaneous every 12 hours  ibuprofen  Tablet. 800 milliGRAM(s) Oral three times a day  latanoprost 0.005% Ophthalmic Solution 1 Drop(s) Right EYE at bedtime  lidocaine   Patch 1 Patch Transdermal daily    MEDICATIONS  (PRN):  bisacodyl 5 milliGRAM(s) Oral daily PRN Constipation  morphine  - Injectable 4 milliGRAM(s) SubCutaneous every 4 hours PRN Severe Pain (7 - 10)  ondansetron Injectable 4 milliGRAM(s) IV Push every 6 hours PRN Nausea and/or Vomiting  traMADol 50 milliGRAM(s) Oral every 6 hours PRN Moderate Pain (4 - 6)        ASSESSMENT AND PLAN:  88f, PMH as above a/w:    1. Mechanical fall, sacral fracture/hematoma, inability to ambulate:  -continue morphine /tramadol for pain  - lidoderm patch  -seen by ortho, wbat  -physical therapy   -monitor h/h  -incentive spirometry  -bowel regimen    2. Acute blood loss anemia:  -due to presacral hematoma.  -tranfused prbcs  -monitor h/h    3. Urinary retention/consipation/dehydration:-  -encourage po fluids  -add miralax/senna to colace  -monitor urine outpt  -should improve with mobilization    4. H/o HTN:  -bp borderline  -continue to hold arb for now.    5. PAF:  -new onset  -f/u echo  -cardio consulted  -hold off on a/c for now-->in sinus.     6. HLD:  -statin    7. DVT px.       d/w rn

## 2019-07-15 NOTE — PROGRESS NOTE ADULT - SUBJECTIVE AND OBJECTIVE BOX
CC:Patient is a 88y old  Female who presents with a chief complaint of sacral fracture with pelvic hematoma (15 Jul 2019 12:14)      Subjective:  Pt seen and examined at bedside with chaperone. Pt is AAOx3, pt in no acute distress. Pt states tolerated c/o sacral pain with meds. Pt denied c/o fever, chills, chest pain, SOB, abd pain, N/V/D, extremity pain or dysfunction, hemoptysis, hematemesis, hematuria, hematochexia, headache, diplopia, vertigo, dizzyness. Pt tolerating diet, (+) void, (+) ambulation, (+) bowel function    ROS:  sacral pain, otherwise as abovementioned ROS    Vital Signs Last 24 Hrs  T(C): 36.5 (15 Jul 2019 09:36), Max: 36.6 (14 Jul 2019 16:26)  T(F): 97.7 (15 Jul 2019 09:36), Max: 97.9 (14 Jul 2019 20:59)  HR: 90 (15 Jul 2019 11:00) (66 - 101)  BP: 110/63 (15 Jul 2019 10:00) (100/60 - 130/59)  BP(mean): 74 (15 Jul 2019 10:00) (65 - 81)  RR: 27 (15 Jul 2019 11:00) (13 - 27)  SpO2: 93% (15 Jul 2019 08:00) (92% - 98%)    Labs:                                9.1    8.92  )-----------( 223      ( 15 Jul 2019 06:33 )             29.7     CBC Full  -  ( 15 Jul 2019 06:33 )  WBC Count : 8.92 K/uL  RBC Count : 3.69 M/uL  Hemoglobin : 9.1 g/dL  Hematocrit : 29.7 %  Platelet Count - Automated : 223 K/uL  Mean Cell Volume : 80.5 fl  Mean Cell Hemoglobin : 24.7 pg  Mean Cell Hemoglobin Concentration : 30.6 gm/dL  Auto Neutrophil # : x  Auto Lymphocyte # : x  Auto Monocyte # : x  Auto Eosinophil # : x  Auto Basophil # : x  Auto Neutrophil % : x  Auto Lymphocyte % : x  Auto Monocyte % : x  Auto Eosinophil % : x  Auto Basophil % : x    07-14    134<L>  |  104  |  16  ----------------------------<  82  4.0   |  24  |  0.50    Ca    7.5<L>      14 Jul 2019 06:32  Phos  3.9     07-14  Mg     2.2     07-14              Meds:  acetaminophen   Tablet .. 650 milliGRAM(s) Oral every 8 hours  atorvastatin 10 milliGRAM(s) Oral daily  bisacodyl 5 milliGRAM(s) Oral daily PRN  calcium carbonate 1250 mG  + Vitamin D (OsCal 500 + D) 1 Tablet(s) Oral daily  cyanocobalamin 1000 MICROGram(s) Oral daily  docusate sodium 100 milliGRAM(s) Oral two times a day  enoxaparin Injectable 30 milliGRAM(s) SubCutaneous every 12 hours  ibuprofen  Tablet. 800 milliGRAM(s) Oral three times a day  latanoprost 0.005% Ophthalmic Solution 1 Drop(s) Right EYE at bedtime  lidocaine   Patch 1 Patch Transdermal daily  morphine  - Injectable 4 milliGRAM(s) SubCutaneous every 4 hours PRN  ondansetron Injectable 4 milliGRAM(s) IV Push every 6 hours PRN  traMADol 50 milliGRAM(s) Oral every 6 hours PRN      Radiology:  < from: CT Pelvis Bony Only No Cont (07.11.19 @ 17:26) >    EXAM:  CT PELVIS BONY ONLY                          EXAM:  CT 3D RECONSTRUCT WO KOTAKSOMAYRA                            PROCEDURE DATE:  07/11/2019          INTERPRETATION:      EXAM:    CT Pelvis Without Contrast, Skeletal     EXAM DATE/TIME:    7/11/2019 5:15 PM     CLINICAL HISTORY:    88 years old, female; Pelvic pain     TECHNIQUE:    Imaging protocol: Axial computed tomography images of the pelvis without   intravenous contrast. Exam focused on the skeletal structures. Coronal   and   sagittal reformatted images were created and reviewed. 3-D reconstruction   images were performed on an independent workstation.    COMPARISON:    No relevant prior studies available.     FINDINGS:   Minimally displaced transversely oriented fracture of thesacrum at the   S2 level with adjacent edema and hematoma in the piriformis muscles   bilaterally. Diffuse bone demineralization limits evaluation for acute   nondisplaced fractures. Degenerative changes of   the lumbosacral spine. Bilateral total hip replacements produce   significant   streak artifact which also limits evaluation for fracture. Chronic   malunited right iliac wing fracture. Chronic malunited fractures of the   right superior and inferior pubic rami. Chronic fracture of the left   ischial tuberosity. Chronic displaced ununited fracture of the right   greater trochanter. Vascular calcifications. There is posterior   paraspinal muscle atrophy.    IMPRESSION:   Minimally displaced transversely oriented fracture of the sacrum at the   S2-3 level with adjacent muscular edema and hematoma.    This represents a change in interpretation from the preliminary report.   This was communicated electronically with the emergency room providers as   per departmental protocol.                JOSÉ ROWELL   This document has been electronically signed. Jul 12 2019  8:58AM    < end of copied text >      Physical exam:  GCS of 15  Pt is aaox3  Pt in no acute distress  Airway is patent  Breathing is symmetric and unlabored  Neuro: CN II-XII grossly intact  Psych: normal affect  HEENT: normocephalic, no gross craniofacial bony pathology to exam  Neck: No tracheal deviation, no JVD, no crepitus, no ecchymosis, no hematoma  Chest: No gross rib or sternal pathology or tenderness to exam, no crepitus, no ecchymosis, no hematoma  Resp: CTAB  CVS: S1S2(+)  ABD: bowel sounds (+), soft, nontender, non distended, no rebound, no guarding, no rigidity, no pelvic instability to exam  EXT: (+) sacral pain associated with known sacral fracture. no calf tenderness or edema to exam b/l, pt has good capillary refill in all digits. Sensoromotor function grossly intact to limited exam, on VTE prophylaxis  Skin: no adverse skin changes to exam

## 2019-07-15 NOTE — PROGRESS NOTE ADULT - ASSESSMENT
A/P:  Sacral fracture  Pre Sacral hematoma  Physical therapy   for d/c planning  Cardiology on consult for Afib  Hospitalist on consult for medical management  Ortho spine on consult, no intervention  GI/DVT prophylaxis  Pain control  F/U labs  Fall risk precautions  Cont current care and meds  Pt will be monitored for signs of evolution/resolution of pathology and surgical intervention as required and warranted  Pt aware of and agrees with all of the above

## 2019-07-15 NOTE — CONSULT NOTE ADULT - SUBJECTIVE AND OBJECTIVE BOX
Patient is a 88y old  Female who presents with a chief complaint of sacral fracture with pelvic hematoma (2019 13:27)      HPI:  88F fall from standing yesterday, no LOC  seen and examined 19 @  as trauma consult (consulted 19 @ )  seen in ER yesterday and discharged home.  she was able to ambulate, but with a significant amount of pain in the sacrum.  she was called back to the ER today for a missed sacral fracture on CT pelvis.  Also A Fib uknown if new onset but rate controlled  No CP/SOB  Complaints of Lower back pain      PAST MEDICAL & SURGICAL HISTORY:  Blindness of left eye  Glaucoma, right eye  Osteoporosis  Hyperlipidemia  HTN (hypertension)  Arthritis  H/O right wrist surgery  H/O bilateral hip replacements      HPI:                PREVIOUS DIAGNOSTIC TESTING:      ECHO  FINDINGS:    STRESS  FINDINGS:    CATHETERIZATION  FINDINGS:    MEDICATIONS  (STANDING):  acetaminophen   Tablet .. 650 milliGRAM(s) Oral every 8 hours  atorvastatin 10 milliGRAM(s) Oral daily  calcium carbonate 1250 mG  + Vitamin D (OsCal 500 + D) 1 Tablet(s) Oral daily  cyanocobalamin 1000 MICROGram(s) Oral daily  docusate sodium 100 milliGRAM(s) Oral two times a day  enoxaparin Injectable 30 milliGRAM(s) SubCutaneous every 12 hours  ibuprofen  Tablet. 800 milliGRAM(s) Oral three times a day  latanoprost 0.005% Ophthalmic Solution 1 Drop(s) Right EYE at bedtime  lidocaine   Patch 1 Patch Transdermal daily    MEDICATIONS  (PRN):  bisacodyl 5 milliGRAM(s) Oral daily PRN Constipation  morphine  - Injectable 4 milliGRAM(s) SubCutaneous every 4 hours PRN Severe Pain (7 - 10)  ondansetron Injectable 4 milliGRAM(s) IV Push every 6 hours PRN Nausea and/or Vomiting  traMADol 50 milliGRAM(s) Oral every 6 hours PRN Moderate Pain (4 - 6)      FAMILY HISTORY:  No pertinent family history in first degree relatives      SOCIAL HISTORY:    CIGARETTES:    ALCOHOL:            Vital Signs Last 24 Hrs  T(C): 36.5 (15 Jul 2019 09:36), Max: 36.6 (2019 16:26)  T(F): 97.7 (15 Jul 2019 09:36), Max: 97.9 (2019 20:59)  HR: 90 (15 Jul 2019 11:00) (66 - 101)  BP: 110/63 (15 Jul 2019 10:00) (100/60 - 130/59)  BP(mean): 74 (15 Jul 2019 10:00) (65 - 81)  RR: 27 (15 Jul 2019 11:00) (13 - 27)  SpO2: 93% (15 Jul 2019 08:00) (92% - 98%)    PHYSICAL EXAM-    Constitutional: The patient appears  alert, lying in bed in mild discomfort     Head: Head is normocephalic and atraumatic.      Neck: The patient's neck is supple without enlargement, has no palpable thyromegaly nor thyroid nodules and has no jugular venous distention. No audible carotid bruits. There are strong carotid pulses bilaterally. No JVD.     Cardiovascular: Irregulary irregular II/VI systolic murmur No murmurs or rubs are appreciated.      Respiratory:  The patient has no rales and no rhonchi. The patient has no wheezes.     Abdomen: Soft, nontender, nondistended with positive bowel sounds.      Extremity: No tenderness. There is no pitting edema, skin discoloration, clubbing and cyanosis.         INTERPRETATION OF TELEMETRY:    ECG:    I&O's Detail    2019 07:01  -  15 Jul 2019 07:00  --------------------------------------------------------  IN:  Total IN: 0 mL    OUT:    Incontinent per Collection Ba mL    Intermittent Catheterization - Urethral: 1100 mL  Total OUT: 1320 mL    Total NET: -1320 mL          LABS:                        9.1    8.92  )-----------( 223      ( 15 Jul 2019 06:33 )             29.7     07-14    134<L>  |  104  |  16  ----------------------------<  82  4.0   |  24  |  0.50    Ca    7.5<L>      2019 06:32  Phos  3.9     07-14  Mg     2.2     07-14              I&O's Summary    2019 07:01  -  15 Jul 2019 07:00  --------------------------------------------------------  IN: 0 mL / OUT: 1320 mL / NET: -1320 mL      BNP  RADIOLOGY & ADDITIONAL STUDIES:

## 2019-07-15 NOTE — PHYSICAL THERAPY INITIAL EVALUATION ADULT - RANGE OF MOTION EXAMINATION, REHAB EVAL
right shoulder elevation AROM limited due to arthritis, PROM WFL/bilateral lower extremity ROM was WFL (within functional limits)/bilateral upper extremity ROM was WFL (within functional limits)

## 2019-07-16 LAB
HCT VFR BLD CALC: 30.8 % — LOW (ref 34.5–45)
HGB BLD-MCNC: 9.2 G/DL — LOW (ref 11.5–15.5)
MCHC RBC-ENTMCNC: 24.5 PG — LOW (ref 27–34)
MCHC RBC-ENTMCNC: 29.9 GM/DL — LOW (ref 32–36)
MCV RBC AUTO: 82.1 FL — SIGNIFICANT CHANGE UP (ref 80–100)
PLATELET # BLD AUTO: 240 K/UL — SIGNIFICANT CHANGE UP (ref 150–400)
RBC # BLD: 3.75 M/UL — LOW (ref 3.8–5.2)
RBC # FLD: 17.1 % — HIGH (ref 10.3–14.5)
WBC # BLD: 5.51 K/UL — SIGNIFICANT CHANGE UP (ref 3.8–10.5)
WBC # FLD AUTO: 5.51 K/UL — SIGNIFICANT CHANGE UP (ref 3.8–10.5)

## 2019-07-16 PROCEDURE — 93010 ELECTROCARDIOGRAM REPORT: CPT

## 2019-07-16 PROCEDURE — 99232 SBSQ HOSP IP/OBS MODERATE 35: CPT

## 2019-07-16 RX ORDER — DILTIAZEM HCL 120 MG
10 CAPSULE, EXT RELEASE 24 HR ORAL ONCE
Refills: 0 | Status: COMPLETED | OUTPATIENT
Start: 2019-07-16 | End: 2019-07-16

## 2019-07-16 RX ORDER — SODIUM CHLORIDE 9 MG/ML
1000 INJECTION INTRAMUSCULAR; INTRAVENOUS; SUBCUTANEOUS ONCE
Refills: 0 | Status: COMPLETED | OUTPATIENT
Start: 2019-07-16 | End: 2019-07-16

## 2019-07-16 RX ORDER — SODIUM CHLORIDE 9 MG/ML
500 INJECTION INTRAMUSCULAR; INTRAVENOUS; SUBCUTANEOUS ONCE
Refills: 0 | Status: COMPLETED | OUTPATIENT
Start: 2019-07-16 | End: 2019-07-16

## 2019-07-16 RX ORDER — DILTIAZEM HCL 120 MG
30 CAPSULE, EXT RELEASE 24 HR ORAL EVERY 6 HOURS
Refills: 0 | Status: DISCONTINUED | OUTPATIENT
Start: 2019-07-16 | End: 2019-07-17

## 2019-07-16 RX ADMIN — LIDOCAINE 1 PATCH: 4 CREAM TOPICAL at 09:55

## 2019-07-16 RX ADMIN — ENOXAPARIN SODIUM 30 MILLIGRAM(S): 100 INJECTION SUBCUTANEOUS at 17:43

## 2019-07-16 RX ADMIN — Medication 800 MILLIGRAM(S): at 23:20

## 2019-07-16 RX ADMIN — Medication 650 MILLIGRAM(S): at 13:29

## 2019-07-16 RX ADMIN — Medication 650 MILLIGRAM(S): at 21:14

## 2019-07-16 RX ADMIN — PREGABALIN 1000 MICROGRAM(S): 225 CAPSULE ORAL at 09:54

## 2019-07-16 RX ADMIN — TRAMADOL HYDROCHLORIDE 50 MILLIGRAM(S): 50 TABLET ORAL at 09:54

## 2019-07-16 RX ADMIN — SENNA PLUS 2 TABLET(S): 8.6 TABLET ORAL at 21:15

## 2019-07-16 RX ADMIN — TRAMADOL HYDROCHLORIDE 50 MILLIGRAM(S): 50 TABLET ORAL at 17:41

## 2019-07-16 RX ADMIN — SODIUM CHLORIDE 500 MILLILITER(S): 9 INJECTION INTRAMUSCULAR; INTRAVENOUS; SUBCUTANEOUS at 23:24

## 2019-07-16 RX ADMIN — TRAMADOL HYDROCHLORIDE 50 MILLIGRAM(S): 50 TABLET ORAL at 14:22

## 2019-07-16 RX ADMIN — Medication 650 MILLIGRAM(S): at 14:15

## 2019-07-16 RX ADMIN — LIDOCAINE 1 PATCH: 4 CREAM TOPICAL at 19:19

## 2019-07-16 RX ADMIN — LIDOCAINE 1 PATCH: 4 CREAM TOPICAL at 00:29

## 2019-07-16 RX ADMIN — MORPHINE SULFATE 4 MILLIGRAM(S): 50 CAPSULE, EXTENDED RELEASE ORAL at 13:44

## 2019-07-16 RX ADMIN — Medication 100 MILLIGRAM(S): at 06:14

## 2019-07-16 RX ADMIN — POLYETHYLENE GLYCOL 3350 17 GRAM(S): 17 POWDER, FOR SOLUTION ORAL at 09:56

## 2019-07-16 RX ADMIN — Medication 800 MILLIGRAM(S): at 05:28

## 2019-07-16 RX ADMIN — ATORVASTATIN CALCIUM 10 MILLIGRAM(S): 80 TABLET, FILM COATED ORAL at 09:54

## 2019-07-16 RX ADMIN — Medication 800 MILLIGRAM(S): at 21:15

## 2019-07-16 RX ADMIN — MORPHINE SULFATE 4 MILLIGRAM(S): 50 CAPSULE, EXTENDED RELEASE ORAL at 14:16

## 2019-07-16 RX ADMIN — LIDOCAINE 1 PATCH: 4 CREAM TOPICAL at 23:20

## 2019-07-16 RX ADMIN — LATANOPROST 1 DROP(S): 0.05 SOLUTION/ DROPS OPHTHALMIC; TOPICAL at 21:15

## 2019-07-16 RX ADMIN — Medication 800 MILLIGRAM(S): at 06:23

## 2019-07-16 RX ADMIN — Medication 650 MILLIGRAM(S): at 23:20

## 2019-07-16 RX ADMIN — Medication 800 MILLIGRAM(S): at 13:29

## 2019-07-16 RX ADMIN — Medication 800 MILLIGRAM(S): at 13:30

## 2019-07-16 RX ADMIN — ENOXAPARIN SODIUM 30 MILLIGRAM(S): 100 INJECTION SUBCUTANEOUS at 06:14

## 2019-07-16 RX ADMIN — SODIUM CHLORIDE 1000 MILLILITER(S): 9 INJECTION INTRAMUSCULAR; INTRAVENOUS; SUBCUTANEOUS at 21:14

## 2019-07-16 RX ADMIN — Medication 30 MILLIGRAM(S): at 17:42

## 2019-07-16 RX ADMIN — Medication 1 TABLET(S): at 09:55

## 2019-07-16 RX ADMIN — Medication 10 MILLIGRAM(S): at 14:21

## 2019-07-16 RX ADMIN — Medication 650 MILLIGRAM(S): at 06:24

## 2019-07-16 NOTE — PROGRESS NOTE ADULT - SUBJECTIVE AND OBJECTIVE BOX
CC:Patient is a 88y old  Female who presents with a chief complaint of sacral fracture with pelvic hematoma (16 Jul 2019 09:18)      Subjective:  Pt seen and examined at bedside with chaperone. Pt is AAOx3, pt in no acute distress. Pt states tolerated c/o sacral pain with meds. Pt denied c/o fever, chills, chest pain, SOB, abd pain, N/V/D, extremity pain or dysfunction, hemoptysis, hematemesis, hematuria, hematochexia, headache, diplopia, vertigo, dizzyness. Pt tolerating diet, (+) void, (+) ambulation with assistance, (+) bowel function    ROS:  sacral pain, otherwise as abovementioned ROS    Vital Signs Last 24 Hrs  T(C): 36.3 (16 Jul 2019 11:05), Max: 36.6 (15 Jul 2019 14:30)  T(F): 97.4 (16 Jul 2019 11:05), Max: 97.8 (15 Jul 2019 14:30)  HR: 98 (16 Jul 2019 11:05) (81 - 113)  BP: 115/81 (16 Jul 2019 11:05) (92/64 - 115/81)  BP(mean): 68 (15 Jul 2019 14:00) (68 - 68)  RR: 19 (16 Jul 2019 11:05) (11 - 19)  SpO2: 98% (16 Jul 2019 11:05) (94% - 98%)    Labs:                                9.1    8.92  )-----------( 223      ( 15 Jul 2019 06:33 )             29.7     CBC Full  -  ( 15 Jul 2019 06:33 )  WBC Count : 8.92 K/uL  RBC Count : 3.69 M/uL  Hemoglobin : 9.1 g/dL  Hematocrit : 29.7 %  Platelet Count - Automated : 223 K/uL  Mean Cell Volume : 80.5 fl  Mean Cell Hemoglobin : 24.7 pg  Mean Cell Hemoglobin Concentration : 30.6 gm/dL  Auto Neutrophil # : x  Auto Lymphocyte # : x  Auto Monocyte # : x  Auto Eosinophil # : x  Auto Basophil # : x  Auto Neutrophil % : x  Auto Lymphocyte % : x  Auto Monocyte % : x  Auto Eosinophil % : x  Auto Basophil % : x                  Meds:  acetaminophen   Tablet .. 650 milliGRAM(s) Oral every 8 hours  atorvastatin 10 milliGRAM(s) Oral daily  bisacodyl 5 milliGRAM(s) Oral daily PRN  calcium carbonate 1250 mG  + Vitamin D (OsCal 500 + D) 1 Tablet(s) Oral daily  cyanocobalamin 1000 MICROGram(s) Oral daily  docusate sodium 100 milliGRAM(s) Oral two times a day  enoxaparin Injectable 30 milliGRAM(s) SubCutaneous every 12 hours  ibuprofen  Tablet. 800 milliGRAM(s) Oral three times a day  latanoprost 0.005% Ophthalmic Solution 1 Drop(s) Right EYE at bedtime  lidocaine   Patch 1 Patch Transdermal daily  morphine  - Injectable 4 milliGRAM(s) SubCutaneous every 4 hours PRN  ondansetron Injectable 4 milliGRAM(s) IV Push every 6 hours PRN  polyethylene glycol 3350 17 Gram(s) Oral daily  senna 2 Tablet(s) Oral at bedtime  sodium chloride 0.9%. 1000 milliLiter(s) IV Continuous <Continuous>  traMADol 50 milliGRAM(s) Oral every 6 hours PRN      Radiology:  < from: Xray Sacrum + Coccyx (07.12.19 @ 23:11) >  EXAM:  XR SACRUM COCCYX MIN 2 VIEWS                            PROCEDURE DATE:  07/12/2019          INTERPRETATION:    Radiographs of the sacrum and coccyx         CLINICAL INFORMATION: Pain.    TECHNIQUE:  Frontal and lateral views of the sacrum and coccyx were   obtained.    FINDINGS:  No prior examinations are available for review.    The sacrum demonstrate a step-off involving the anterior cortex of the S4   sacral vertebral body which may reflect a sacral fracture. CT scan can be   used for confirmation.  Sacral neural foramina appear symmetric. The   sacroiliac joints are intact.  The coccyx appears unremarkable.    IMPRESSION: Osteopenia.step-off involving the anterior cortex of the S4   sacral vertebral body which may reflect a sacral fracture. CT scan can be   used for confirmation.                 RUSLAN CANCHOLA M.D., ATTENDING RADIOLOGIST  This document has been electronically signed. Jul 13 2019 11:30AM    < end of copied text >      Physical exam:  GCS of 15  Pt is aaox3  Pt in no acute distress  Airway is patent  Breathing is symmetric and unlabored  Neuro: CN II-XII grossly intact  Psych: normal affect  HEENT: normocephalic, no gross craniofacial bony pathology to exam  Neck: No tracheal deviation, no JVD, no crepitus, no ecchymosis, no hematoma  Chest: No gross rib or sternal pathology or tenderness to exam, no crepitus, no ecchymosis, no hematoma  Resp: CTAB  CVS: S1S2(+)  ABD: bowel sounds (+), soft, nontender, non distended, no rebound, no guarding, no rigidity, no pelvic instability to exam  EXT: (+) sacral pain associated with known sacral fracture. no calf tenderness or edema to exam b/l, pt has good capillary refill in all digits. Sensoromotor function grossly intact to limited exam, on VTE prophylaxis  Skin: no adverse skin changes to exam

## 2019-07-16 NOTE — PROGRESS NOTE ADULT - ASSESSMENT
ASSESSMENT AND PLAN:  88f, PMH as above a/w:    1. Mechanical fall, sacral fracture/hematoma, inability to ambulate. Mgmt as per ortho, primary team.   Pain control, PT.     2. Atrial fibrillation. Presently in SR.   Hold on AV anson agents with mild hypotension.   No LTA for now in setting of SR, recent fall.   Can have outpt monitoring.   2Decho- Nl LV fxn, mild MR, mild to mod TR.    3. Acute blood loss anemia:  due to presacral hematoma.  tranfused prbcs  monitor h/h    4. HTN. Labile. Hold antihypertensives.     5. DVT proph.

## 2019-07-16 NOTE — PROVIDER CONTACT NOTE (OTHER) - SITUATION
pt. HR 110s-150s afib, also w/ low BP 91/61 (left arm) 89/51(right arm)  s/p 1L bolus w/ no improvement. pt. asymptomatic at this time. resting comfortably

## 2019-07-16 NOTE — PROGRESS NOTE ADULT - SUBJECTIVE AND OBJECTIVE BOX
Cardiology Progress Note    HPI: 88F, pmh of HTn, HLD, Osteoporosis, Osteoarthritis, left eye blindness, urinary incontinence, glaucoma who presented to the ER with back pain. She had fallen 7/11 after slipping from her chair while working,  seen in ed and was able to ambulate. Preliminary imaging was negative and she was discharged home. She was called later that upon further review she was noted to have a sacral fracture. The following day, her pain worsened and was unable to get out of bed on her own so she returned to Ed. She was admitted to trauma. Transfused prbcs for acute blood loss anemia from presacral hematoma.  Hospitalist service now consulted for medical comanagement.    7/16. No events last pm. No CP/SOB. Lying flat in bed. Pt with diffuse body pain.  Tele- SR with [facility].     ROS: all 10 systems reviewed and is as above otherwise negative.     Vital Signs Last 24 Hrs  T(C): 36.5 (15 Jul 2019 09:36), Max: 36.6 (14 Jul 2019 16:26)  T(F): 97.7 (15 Jul 2019 09:36), Max: 97.9 (14 Jul 2019 20:59)  HR: 81 (15 Jul 2019 14:00) (66 - 113)  BP: 101/56 (15 Jul 2019 14:00) (100/60 - 130/59)  BP(mean): 68 (15 Jul 2019 14:00) (65 - 81)  RR: 11 (15 Jul 2019 14:00) (11 - 27)  SpO2: 94% (15 Jul 2019 14:00) (92% - 98%)    PHYSICAL EXAM:    GENERAL: Elderly female, thin appearing,  Comfortable, no acute distress   HEAD:  Normocephalic, atraumatic  EYES: Left eye blindness. Right eye-eomi  HEENT: Moist mucous membranes  NECK: Supple, No JVD  NERVOUS SYSTEM:  Alert & Oriented X3, non focal  CHEST/LUNG: Clear to auscultation bilaterally  HEART: Regular rate and rhythm  ABDOMEN: Soft, Nontender, Nondistended, Bowel sounds present  GENITOURINARY: Voiding, no palpable bladder  EXTREMITIES:   No clubbing, cyanosis, or edema  MUSCULOSKELETAL- . +OA b/l  hands  SKIN-no rash    LABS:                        9.1    8.92  )-----------( 223      ( 15 Jul 2019 06:33 )             29.7     07-14    134<L>  |  104  |  16  ----------------------------<  82  4.0   |  24  |  0.50    Ca    7.5<L>      14 Jul 2019 06:32  Phos  3.9     07-14  Mg     2.2     07-14        CT Cervical Spine No Cont (07.11.19 @ 17:27) >  IMPRESSION:   No vertebral fracture is recognized.  Multilevel   degenerative changes of the cervical spine are present.    CT Pelvis Bony Only No Cont (07.11.19 @ 17:26) >  IMPRESSION:   Minimally displaced transversely oriented fracture of the sacrum at the   S2-3 level with adjacent muscular edema and hematoma.    This represents a change in interpretation from the preliminary report.   This was communicated electronically with the emergency room providers as   per departmental protocol.    MEDICATIONS  (STANDING):  acetaminophen   Tablet .. 650 milliGRAM(s) Oral every 8 hours  atorvastatin 10 milliGRAM(s) Oral daily  calcium carbonate 1250 mG  + Vitamin D (OsCal 500 + D) 1 Tablet(s) Oral daily  cyanocobalamin 1000 MICROGram(s) Oral daily  docusate sodium 100 milliGRAM(s) Oral two times a day  enoxaparin Injectable 30 milliGRAM(s) SubCutaneous every 12 hours  ibuprofen  Tablet. 800 milliGRAM(s) Oral three times a day  latanoprost 0.005% Ophthalmic Solution 1 Drop(s) Right EYE at bedtime  lidocaine   Patch 1 Patch Transdermal daily    MEDICATIONS  (PRN):  bisacodyl 5 milliGRAM(s) Oral daily PRN Constipation  morphine  - Injectable 4 milliGRAM(s) SubCutaneous every 4 hours PRN Severe Pain (7 - 10)  ondansetron Injectable 4 milliGRAM(s) IV Push every 6 hours PRN Nausea and/or Vomiting  traMADol 50 milliGRAM(s) Oral every 6 hours PRN Moderate Pain (4 - 6)    < from: Transthoracic Echocardiogram (07.15.19 @ 11:27) >  Left ventricle systolic function appears preserved based on difficult   transthoracic views; segmental wall motion abnormalities can not be rule   out.   The left ventricle is normal in size.   Visual estimation of left ventricle ejection fraction is 55-60%.   The left atrium is mildly dilated.   Normal appearing right ventricle structure and function.     The aortic valve is well visualized, appears mildly sclerotic. Valve   opening seems to be normal.   Trace aortic regurgitation is present.   Fibrocalcific changes noted to the mitral valve leaflets with preserved   leaflet excursion.   Posterior mitral annular calcification noted.   Trace to mild mitral regurgitation is present.   The tricuspid valve leaflets appear mildly thickened and/or calcified,   but   open well.   Mild to moderate Tricuspid regurgitation is present. Mild pulmonary   hypertension.     No evidence of pericardial effusion.      < end of copied text >

## 2019-07-16 NOTE — PROGRESS NOTE ADULT - SUBJECTIVE AND OBJECTIVE BOX
c/c: fall, back pain    HPI: 88F, pmh of HTn, HLD, Osteoporosis, Osteoarthritis, left eye blindness, urinary incontinence, glaucoma who presented to the ER with back pain. She had fallen 7/11 after slipping from her chair while working,  seen in ed and was able to ambulate. Preliminary imaging was negative and she was discharged home. She was called later that upon further review she was noted to have a sacral fracture. The following day, her pain worsened and was unable to get out of bed on her own so she returned to Ed. She was admitted to trauma. Transfused prbcs for acute blood loss anemia from presacral hematoma.  Hospitalist service now consulted for medical comanagement.    7/16: pt seen and examined this am. Was asking for pain meds. No sob/chest pain. Tolerating po. states she did some exercises with physical therapy in bed yesterday.     ROS: all 10 systems reviewed and is as above otherwise negative.     Vital Signs Last 24 Hrs  T(C): 36.3 (16 Jul 2019 11:05), Max: 36.6 (15 Jul 2019 14:30)  T(F): 97.4 (16 Jul 2019 11:05), Max: 97.8 (15 Jul 2019 14:30)  HR: 98 (16 Jul 2019 11:05) (81 - 98)  BP: 115/81 (16 Jul 2019 11:05) (92/64 - 115/81)  BP(mean): 68 (15 Jul 2019 14:00) (68 - 68)  RR: 19 (16 Jul 2019 11:05) (11 - 19)  SpO2: 98% (16 Jul 2019 11:05) (94% - 98%)    PHYSICAL EXAM:    GENERAL: Elderly female, thin appearing,  Comfortable, no acute distress   HEAD:  Normocephalic, atraumatic  EYES: Left eye blindness. Right eye-eomi  HEENT: Moist mucous membranes  NECK: Supple, No JVD  NERVOUS SYSTEM:  Alert & Oriented X3, non focal  CHEST/LUNG: Clear to auscultation bilaterally  HEART: Regular rate and rhythm  ABDOMEN: Soft, Nontender, Nondistended, Bowel sounds present  GENITOURINARY: Voiding, no palpable bladder  EXTREMITIES:   No clubbing, cyanosis, or edema  MUSCULOSKELETAL- . +OA b/l  hands  SKIN-no rash    LABS:                        9.2    5.51  )-----------( 240      ( 16 Jul 2019 11:08 )             30.8               CT Cervical Spine No Cont (07.11.19 @ 17:27) >  IMPRESSION:   No vertebral fracture is recognized.  Multilevel   degenerative changes of the cervical spine are present.    CT Pelvis Bony Only No Cont (07.11.19 @ 17:26) >  IMPRESSION:   Minimally displaced transversely oriented fracture of the sacrum at the   S2-3 level with adjacent muscular edema and hematoma.    This represents a change in interpretation from the preliminary report.   This was communicated electronically with the emergency room providers as   per departmental protocol.    MEDICATIONS  (STANDING):  acetaminophen   Tablet .. 650 milliGRAM(s) Oral every 8 hours  atorvastatin 10 milliGRAM(s) Oral daily  calcium carbonate 1250 mG  + Vitamin D (OsCal 500 + D) 1 Tablet(s) Oral daily  cyanocobalamin 1000 MICROGram(s) Oral daily  docusate sodium 100 milliGRAM(s) Oral two times a day  enoxaparin Injectable 30 milliGRAM(s) SubCutaneous every 12 hours  ibuprofen  Tablet. 800 milliGRAM(s) Oral three times a day  latanoprost 0.005% Ophthalmic Solution 1 Drop(s) Right EYE at bedtime  lidocaine   Patch 1 Patch Transdermal daily    MEDICATIONS  (PRN):  bisacodyl 5 milliGRAM(s) Oral daily PRN Constipation  morphine  - Injectable 4 milliGRAM(s) SubCutaneous every 4 hours PRN Severe Pain (7 - 10)  ondansetron Injectable 4 milliGRAM(s) IV Push every 6 hours PRN Nausea and/or Vomiting  traMADol 50 milliGRAM(s) Oral every 6 hours PRN Moderate Pain (4 - 6)        ASSESSMENT AND PLAN:  88f, PMH as above a/w:    1. Mechanical fall, sacral fracture/hematoma, inability to ambulate:  -continue morphine /tramadol for pain  - lidoderm patch  -seen by ortho, wbat  -physical therapy   -monitor h/h  -incentive spirometry  -bowel regimen    2. Acute blood loss anemia:  -due to presacral hematoma.  -tranfused prbcs  -monitor h/h    3. Urinary retention/consipation/dehydration:-  -given ivf yesterday  -encourage po fluids.   -added miralax/senna to colace  -jeffers placed overnight  -should improve with mobilization    4. H/o HTN:  -bp borderline  -continue to hold arb for now.    5. PAF:  -new onset  -f/u echo  -cardio consulted  -hold off on a/c for now-->in sinus.     6. HLD:  -statin    7. DVT px.     8. Dispo:  2N if ok with trauma

## 2019-07-17 PROCEDURE — 99232 SBSQ HOSP IP/OBS MODERATE 35: CPT

## 2019-07-17 RX ORDER — DIGOXIN 250 MCG
0.12 TABLET ORAL DAILY
Refills: 0 | Status: DISCONTINUED | OUTPATIENT
Start: 2019-07-17 | End: 2019-07-19

## 2019-07-17 RX ADMIN — Medication 650 MILLIGRAM(S): at 18:12

## 2019-07-17 RX ADMIN — Medication 0.12 MILLIGRAM(S): at 21:15

## 2019-07-17 RX ADMIN — Medication 650 MILLIGRAM(S): at 05:52

## 2019-07-17 RX ADMIN — TRAMADOL HYDROCHLORIDE 50 MILLIGRAM(S): 50 TABLET ORAL at 17:50

## 2019-07-17 RX ADMIN — Medication 30 MILLIGRAM(S): at 05:53

## 2019-07-17 RX ADMIN — Medication 800 MILLIGRAM(S): at 19:39

## 2019-07-17 RX ADMIN — ENOXAPARIN SODIUM 30 MILLIGRAM(S): 100 INJECTION SUBCUTANEOUS at 05:53

## 2019-07-17 RX ADMIN — Medication 800 MILLIGRAM(S): at 18:10

## 2019-07-17 RX ADMIN — Medication 800 MILLIGRAM(S): at 21:43

## 2019-07-17 RX ADMIN — Medication 650 MILLIGRAM(S): at 19:39

## 2019-07-17 RX ADMIN — Medication 100 MILLIGRAM(S): at 05:51

## 2019-07-17 RX ADMIN — POLYETHYLENE GLYCOL 3350 17 GRAM(S): 17 POWDER, FOR SOLUTION ORAL at 11:36

## 2019-07-17 RX ADMIN — Medication 800 MILLIGRAM(S): at 21:13

## 2019-07-17 RX ADMIN — LATANOPROST 1 DROP(S): 0.05 SOLUTION/ DROPS OPHTHALMIC; TOPICAL at 21:15

## 2019-07-17 RX ADMIN — ENOXAPARIN SODIUM 30 MILLIGRAM(S): 100 INJECTION SUBCUTANEOUS at 18:08

## 2019-07-17 RX ADMIN — TRAMADOL HYDROCHLORIDE 50 MILLIGRAM(S): 50 TABLET ORAL at 21:13

## 2019-07-17 RX ADMIN — TRAMADOL HYDROCHLORIDE 50 MILLIGRAM(S): 50 TABLET ORAL at 11:36

## 2019-07-17 RX ADMIN — Medication 1 TABLET(S): at 11:36

## 2019-07-17 RX ADMIN — Medication 800 MILLIGRAM(S): at 05:52

## 2019-07-17 RX ADMIN — ATORVASTATIN CALCIUM 10 MILLIGRAM(S): 80 TABLET, FILM COATED ORAL at 11:36

## 2019-07-17 RX ADMIN — Medication 30 MILLIGRAM(S): at 11:36

## 2019-07-17 RX ADMIN — LIDOCAINE 1 PATCH: 4 CREAM TOPICAL at 11:37

## 2019-07-17 RX ADMIN — PREGABALIN 1000 MICROGRAM(S): 225 CAPSULE ORAL at 11:36

## 2019-07-17 RX ADMIN — LIDOCAINE 1 PATCH: 4 CREAM TOPICAL at 19:12

## 2019-07-17 NOTE — PROGRESS NOTE ADULT - SUBJECTIVE AND OBJECTIVE BOX
c/c: fall, back pain    HPI: 88F, pmh of HTn, HLD, Osteoporosis, Osteoarthritis, left eye blindness, urinary incontinence, glaucoma who presented to the ER with back pain. She had fallen 7/11 after slipping from her chair while working,  seen in ed and was able to ambulate. Preliminary imaging was negative and she was discharged home. She was called later that upon further review she was noted to have a sacral fracture. The following day, her pain worsened and was unable to get out of bed on her own so she returned to Ed. She was admitted to trauma. Transfused prbcs for acute blood loss anemia from presacral hematoma.  Hospitalist service now consulted for medical comanagement. Hospital course notable for new onset paroxysmal atrial fibrillation.   Also with urinary retention requiring jeffers    7/17: pt seen and examined. Feeling better. Pain controlled when laying flat. DId not get out of bed today.     ROS: all 10 systems reviewed and is as above otherwise negative.     Vital Signs Last 24 Hrs  T(C): 36.4 (17 Jul 2019 11:56), Max: 36.6 (16 Jul 2019 20:17)  T(F): 97.6 (17 Jul 2019 11:56), Max: 97.8 (16 Jul 2019 20:17)  HR: 95 (17 Jul 2019 11:56) (72 - 125)  BP: 152/87 (17 Jul 2019 11:56) (91/61 - 152/87)  RR: 18 (17 Jul 2019 11:56) (18 - 18)  SpO2: 97% (17 Jul 2019 11:56) (93% - 97%)    PHYSICAL EXAM:    GENERAL: Elderly female, thin appearing,  Comfortable, no acute distress   HEAD:  Normocephalic, atraumatic  EYES: Left eye blindness. Right eye-eomi  HEENT: Moist mucous membranes  NECK: Supple, No JVD  NERVOUS SYSTEM:  Alert & Oriented X3, non focal  CHEST/LUNG: Clear to auscultation bilaterally  HEART: Regular rate and rhythm  ABDOMEN: Soft, Nontender, Nondistended, Bowel sounds present  GENITOURINARY: Voiding, no palpable bladder  EXTREMITIES:   No clubbing, cyanosis, or edema  MUSCULOSKELETAL- . +OA b/l  hands  SKIN-no rash  LABS:                        9.2    5.51  )-----------( 240      ( 16 Jul 2019 11:08 )             30.8       CT Cervical Spine No Cont (07.11.19 @ 17:27) >  IMPRESSION:   No vertebral fracture is recognized.  Multilevel   degenerative changes of the cervical spine are present.    CT Pelvis Bony Only No Cont (07.11.19 @ 17:26) >  IMPRESSION:   Minimally displaced transversely oriented fracture of the sacrum at the   S2-3 level with adjacent muscular edema and hematoma.    This represents a change in interpretation from the preliminary report.   This was communicated electronically with the emergency room providers as   per departmental protocol.    MEDICATIONS  (STANDING):  acetaminophen   Tablet .. 650 milliGRAM(s) Oral every 8 hours  atorvastatin 10 milliGRAM(s) Oral daily  calcium carbonate 1250 mG  + Vitamin D (OsCal 500 + D) 1 Tablet(s) Oral daily  cyanocobalamin 1000 MICROGram(s) Oral daily  docusate sodium 100 milliGRAM(s) Oral two times a day  enoxaparin Injectable 30 milliGRAM(s) SubCutaneous every 12 hours  ibuprofen  Tablet. 800 milliGRAM(s) Oral three times a day  latanoprost 0.005% Ophthalmic Solution 1 Drop(s) Right EYE at bedtime  lidocaine   Patch 1 Patch Transdermal daily    MEDICATIONS  (PRN):  bisacodyl 5 milliGRAM(s) Oral daily PRN Constipation  morphine  - Injectable 4 milliGRAM(s) SubCutaneous every 4 hours PRN Severe Pain (7 - 10)  ondansetron Injectable 4 milliGRAM(s) IV Push every 6 hours PRN Nausea and/or Vomiting  traMADol 50 milliGRAM(s) Oral every 6 hours PRN Moderate Pain (4 - 6)        ASSESSMENT AND PLAN:  88f, PMH as above a/w:    1. Mechanical fall, sacral fracture/hematoma, inability to ambulate:  - continue morphine /tramadol for pain  - lidoderm patch  -seen by ortho, wbat  -physical therapy   -monitor h/h  -incentive spirometry  -bowel regimen    2. Acute blood loss anemia:  -due to presacral hematoma.  -tranfused prbcs  -monitor h/h    3. Urinary retention/consipation/dehydration:-  -s/p IVF  -encourage po fluids.   -added miralax/senna to colace  -jeffers placed   -should improve with mobilization  -would dc with jeffers and have TOV @ Rehab once more mobile in 2-3 days post discharge    4. H/o HTN:  -bp borderline  -continue to hold arb for now.    5. PAF:  -new onset  -echo with mild mr/trace AI, moderate TR, preserved LVEF, mildly dilated LA  -cardio consulted  -hold off on a/c for now given hematoma, fall risk  -7/16 with rapid a fib, sustained, improved with iv and po cardizem, but unable to get cardizem regularly d/t blood pressure  -dc cardizem. start dig 0.125 mg daily, check levels in next few days.    6. HLD:  -statin    7. DVT px.     8. Dispo:  2N if ok with trauma  dc planning hopefully tomorrow to rehab if stable

## 2019-07-17 NOTE — PROGRESS NOTE ADULT - SUBJECTIVE AND OBJECTIVE BOX
Cardiology Progress Note    HPI: 88F, pmh of HTn, HLD, Osteoporosis, Osteoarthritis, left eye blindness, urinary incontinence, glaucoma who presented to the ER with back pain. She had fallen 7/11 after slipping from her chair while working,  seen in ed and was able to ambulate. Preliminary imaging was negative and she was discharged home. She was called later that upon further review she was noted to have a sacral fracture. The following day, her pain worsened and was unable to get out of bed on her own so she returned to Ed. She was admitted to trauma. Transfused prbcs for acute blood loss anemia from presacral hematoma.  Hospitalist service now consulted for medical comanagement.    7/16. No events last pm. No CP/SOB. Lying flat in bed. Pt with diffuse body pain.  Tele- SR with [facility].     7/17. No events last pm. Lying in bed, not moving much. No CP. Feels weak/tired.     ROS: all 10 systems reviewed and is as above otherwise negative.     Vital Signs Last 24 Hrs  T(C): 36.5 (15 Jul 2019 09:36), Max: 36.6 (14 Jul 2019 16:26)  T(F): 97.7 (15 Jul 2019 09:36), Max: 97.9 (14 Jul 2019 20:59)  HR: 81 (15 Jul 2019 14:00) (66 - 113)  BP: 101/56 (15 Jul 2019 14:00) (100/60 - 130/59)  BP(mean): 68 (15 Jul 2019 14:00) (65 - 81)  RR: 11 (15 Jul 2019 14:00) (11 - 27)  SpO2: 94% (15 Jul 2019 14:00) (92% - 98%)    PHYSICAL EXAM:    GENERAL: Elderly female, thin appearing,  Comfortable, no acute distress   HEAD:  Normocephalic, atraumatic  EYES: Left eye blindness. Right eye-eomi  HEENT: Moist mucous membranes  NECK: Supple, No JVD  NERVOUS SYSTEM:  Alert & Oriented X3, non focal  CHEST/LUNG: Clear to auscultation bilaterally  HEART: Regular rate and rhythm  ABDOMEN: Soft, Nontender, Nondistended, Bowel sounds present  GENITOURINARY: Voiding, no palpable bladder  EXTREMITIES:   No clubbing, cyanosis, or edema  MUSCULOSKELETAL- . +OA b/l  hands  SKIN-no rash    LABS:                        9.1    8.92  )-----------( 223      ( 15 Jul 2019 06:33 )             29.7     07-14    134<L>  |  104  |  16  ----------------------------<  82  4.0   |  24  |  0.50    Ca    7.5<L>      14 Jul 2019 06:32  Phos  3.9     07-14  Mg     2.2     07-14        CT Cervical Spine No Cont (07.11.19 @ 17:27) >  IMPRESSION:   No vertebral fracture is recognized.  Multilevel   degenerative changes of the cervical spine are present.    CT Pelvis Bony Only No Cont (07.11.19 @ 17:26) >  IMPRESSION:   Minimally displaced transversely oriented fracture of the sacrum at the   S2-3 level with adjacent muscular edema and hematoma.    This represents a change in interpretation from the preliminary report.   This was communicated electronically with the emergency room providers as   per departmental protocol.    MEDICATIONS  (STANDING):  acetaminophen   Tablet .. 650 milliGRAM(s) Oral every 8 hours  atorvastatin 10 milliGRAM(s) Oral daily  calcium carbonate 1250 mG  + Vitamin D (OsCal 500 + D) 1 Tablet(s) Oral daily  cyanocobalamin 1000 MICROGram(s) Oral daily  docusate sodium 100 milliGRAM(s) Oral two times a day  enoxaparin Injectable 30 milliGRAM(s) SubCutaneous every 12 hours  ibuprofen  Tablet. 800 milliGRAM(s) Oral three times a day  latanoprost 0.005% Ophthalmic Solution 1 Drop(s) Right EYE at bedtime  lidocaine   Patch 1 Patch Transdermal daily    MEDICATIONS  (PRN):  bisacodyl 5 milliGRAM(s) Oral daily PRN Constipation  morphine  - Injectable 4 milliGRAM(s) SubCutaneous every 4 hours PRN Severe Pain (7 - 10)  ondansetron Injectable 4 milliGRAM(s) IV Push every 6 hours PRN Nausea and/or Vomiting  traMADol 50 milliGRAM(s) Oral every 6 hours PRN Moderate Pain (4 - 6)    < from: Transthoracic Echocardiogram (07.15.19 @ 11:27) >  Left ventricle systolic function appears preserved based on difficult   transthoracic views; segmental wall motion abnormalities can not be rule   out.   The left ventricle is normal in size.   Visual estimation of left ventricle ejection fraction is 55-60%.   The left atrium is mildly dilated.   Normal appearing right ventricle structure and function.     The aortic valve is well visualized, appears mildly sclerotic. Valve   opening seems to be normal.   Trace aortic regurgitation is present.   Fibrocalcific changes noted to the mitral valve leaflets with preserved   leaflet excursion.   Posterior mitral annular calcification noted.   Trace to mild mitral regurgitation is present.   The tricuspid valve leaflets appear mildly thickened and/or calcified,   but   open well.   Mild to moderate Tricuspid regurgitation is present. Mild pulmonary   hypertension.     No evidence of pericardial effusion.      < end of copied text >

## 2019-07-17 NOTE — PROGRESS NOTE ADULT - ASSESSMENT
A/P: 88 year old female with Sacral fracture, Pre Sacral hematoma    - Physical therapy/ambulation  -  for d/c planning  - Cardiology on consult for Afib  - Hospitalist on consult for medical management  - Ortho spine on consult, no intervention  - GI/DVT prophylaxis  - Pain control  F/U labs  Fall risk precautions    Discussed with Dr. Cintron

## 2019-07-17 NOTE — PROGRESS NOTE ADULT - SUBJECTIVE AND OBJECTIVE BOX
Subjective:  Pt seen and examined at bedside with surgery team. Pt is AAOx3, pt in no acute distress. Pt states that she has not been able to walk well and complains of continued back pain.     Vital Signs Last 24 Hrs  T(C): 36.3 (17 Jul 2019 05:52), Max: 36.6 (16 Jul 2019 20:17)  T(F): 97.3 (17 Jul 2019 05:52), Max: 97.8 (16 Jul 2019 20:17)  HR: 72 (17 Jul 2019 05:52) (72 - 125)  BP: 114/58 (17 Jul 2019 05:52) (91/61 - 115/81)  BP(mean): --  RR: 18 (17 Jul 2019 05:52) (18 - 19)  SpO2: 93% (17 Jul 2019 05:52) (93% - 98%)      Physical exam:  GCS of 15  Pt is aaox3  Pt in no acute distress  Airway is patent  Breathing is symmetric and unlabored  Neuro: CN II-XII grossly intact  Psych: normal affect  HEENT: normocephalic, no gross craniofacial bony pathology to exam  Neck: No tracheal deviation, no JVD, no crepitus, no ecchymosis, no hematoma  Chest: No gross rib or sternal pathology or tenderness to exam, no crepitus, no ecchymosis, no hematoma  Resp: CTAB  CVS: S1S2(+)  ABD: bowel sounds (+), soft, nontender, non distended, no rebound, no guarding, no rigidity, no pelvic instability to exam  EXT: (+) sacral pain associated with known sacral fracture. no calf tenderness or edema to exam b/l, pt has good capillary refill in all digits. Sensoromotor function grossly intact to limited exam, on VTE prophylaxis  Skin: no adverse skin changes to exam      Complete Blood Count (07.16.19 @ 11:08)    WBC Count: 5.51 K/uL    RBC Count: 3.75 M/uL    Hemoglobin: 9.2 g/dL    Hematocrit: 30.8 %    Mean Cell Volume: 82.1 fl    Mean Cell Hemoglobin: 24.5 pg    Mean Cell Hemoglobin Conc: 29.9 gm/dL    Red Cell Distrib Width: 17.1 %    Platelet Count - Automated: 240 K/uL

## 2019-07-17 NOTE — PROGRESS NOTE ADULT - ASSESSMENT
ASSESSMENT AND PLAN:  88f, PMH as above a/w:    1. Mechanical fall, sacral fracture/hematoma, inability to ambulate. Mgmt as per ortho, primary team.   Pain control, PT.     2. Atrial fibrillation. Presently in SR.   Hold on AV anson agents with mild hypotension.   No LTA for now in setting of SR, recent fall.   Can have outpt monitoring.   2Decho- Nl LV fxn, mild MR, mild to mod TR.    3. Acute blood loss anemia:  due to presacral hematoma.  tranfused prbcs as per primary team.  monitor h/h    4. HTN. Labile. Hold antihypertensives.     5. DVT proph. PT eval.

## 2019-07-17 NOTE — PROGRESS NOTE ADULT - ATTENDING COMMENTS
seen and examined 07-17-19 @ 0850    c/o sacral pain when sitting or attempting to stand, but no pain when laying supine    5/5 strength right hip flexion, 5/5 strength ankles  4/5 strength left hip flexion, but limited by gravity, 5/5 strength ankles      S2-3 minimally displaced sacral fractures  -WBAT  -uses rolling walker at home  -on ibuprofen for pain from chronic osteoarthritis  -Tylenol, tramadol PRN pain, morphine PRN pain    small presacral hematoma  acute blood loss anemia  -transfused 1u RBC on 7/13  -on chemical VTE prophylaxis since 7/14  -no evidence of ongoing bleeding    dispo  -clear for RONAL placement seen and examined 07-17-19 @ 0850    c/o sacral pain when sitting or attempting to stand, but no pain when laying supine    5/5 strength right hip flexion, 5/5 strength ankles  4/5 strength left hip flexion, but limited by gravity, 5/5 strength ankles      S2-3 minimally displaced sacral fractures  -WBAT  -uses rolling walker at home  -on ibuprofen for pain from chronic osteoarthritis  -Tylenol, tramadol PRN pain, morphine PRN pain    urinary retention  -very unlikely secondary to sacral nerve injury given no other neuro deficit and no involvement of S2-4 foramen  -[place]    small presacral hematoma  acute blood loss anemia  -transfused 1u RBC on 7/13  -on chemical VTE prophylaxis since 7/14  -no evidence of ongoing bleeding    dispo  -clear for RONAL placement

## 2019-07-18 LAB
HCT VFR BLD CALC: 30.4 % — LOW (ref 34.5–45)
HGB BLD-MCNC: 9.1 G/DL — LOW (ref 11.5–15.5)
MCHC RBC-ENTMCNC: 24.5 PG — LOW (ref 27–34)
MCHC RBC-ENTMCNC: 29.9 GM/DL — LOW (ref 32–36)
MCV RBC AUTO: 81.7 FL — SIGNIFICANT CHANGE UP (ref 80–100)
PLATELET # BLD AUTO: 276 K/UL — SIGNIFICANT CHANGE UP (ref 150–400)
RBC # BLD: 3.72 M/UL — LOW (ref 3.8–5.2)
RBC # FLD: 17.6 % — HIGH (ref 10.3–14.5)
WBC # BLD: 5.05 K/UL — SIGNIFICANT CHANGE UP (ref 3.8–10.5)
WBC # FLD AUTO: 5.05 K/UL — SIGNIFICANT CHANGE UP (ref 3.8–10.5)

## 2019-07-18 PROCEDURE — 99232 SBSQ HOSP IP/OBS MODERATE 35: CPT

## 2019-07-18 RX ORDER — PREGABALIN 225 MG/1
1 CAPSULE ORAL
Qty: 0 | Refills: 0 | DISCHARGE
Start: 2019-07-18

## 2019-07-18 RX ORDER — ATORVASTATIN CALCIUM 80 MG/1
1 TABLET, FILM COATED ORAL
Qty: 0 | Refills: 0 | DISCHARGE
Start: 2019-07-18

## 2019-07-18 RX ORDER — ATORVASTATIN CALCIUM 80 MG/1
1 TABLET, FILM COATED ORAL
Qty: 0 | Refills: 0 | DISCHARGE

## 2019-07-18 RX ORDER — SODIUM CHLORIDE 9 MG/ML
1000 INJECTION INTRAMUSCULAR; INTRAVENOUS; SUBCUTANEOUS
Refills: 0 | Status: DISCONTINUED | OUTPATIENT
Start: 2019-07-18 | End: 2019-07-22

## 2019-07-18 RX ORDER — LATANOPROST 0.05 MG/ML
1 SOLUTION/ DROPS OPHTHALMIC; TOPICAL
Qty: 0 | Refills: 0 | DISCHARGE
Start: 2019-07-18

## 2019-07-18 RX ORDER — SENNA PLUS 8.6 MG/1
2 TABLET ORAL
Qty: 0 | Refills: 0 | DISCHARGE
Start: 2019-07-18

## 2019-07-18 RX ORDER — IBUPROFEN 200 MG
1 TABLET ORAL
Qty: 0 | Refills: 0 | DISCHARGE

## 2019-07-18 RX ORDER — DIGOXIN 250 MCG
1 TABLET ORAL
Qty: 0 | Refills: 0 | DISCHARGE
Start: 2019-07-18

## 2019-07-18 RX ORDER — METOPROLOL TARTRATE 50 MG
2.5 TABLET ORAL EVERY 6 HOURS
Refills: 0 | Status: DISCONTINUED | OUTPATIENT
Start: 2019-07-18 | End: 2019-07-22

## 2019-07-18 RX ORDER — DOCUSATE SODIUM 100 MG
1 CAPSULE ORAL
Qty: 0 | Refills: 0 | DISCHARGE
Start: 2019-07-18

## 2019-07-18 RX ORDER — DIGOXIN 250 MCG
0.25 TABLET ORAL EVERY 6 HOURS
Refills: 0 | Status: DISCONTINUED | OUTPATIENT
Start: 2019-07-18 | End: 2019-07-19

## 2019-07-18 RX ORDER — METOPROLOL TARTRATE 50 MG
2.5 TABLET ORAL ONCE
Refills: 0 | Status: COMPLETED | OUTPATIENT
Start: 2019-07-18 | End: 2019-07-18

## 2019-07-18 RX ORDER — TRAMADOL HYDROCHLORIDE 50 MG/1
1 TABLET ORAL
Qty: 0 | Refills: 0 | DISCHARGE
Start: 2019-07-18

## 2019-07-18 RX ORDER — LIDOCAINE 4 G/100G
1 CREAM TOPICAL
Qty: 0 | Refills: 0 | DISCHARGE
Start: 2019-07-18

## 2019-07-18 RX ORDER — IRBESARTAN 75 MG/1
1 TABLET ORAL
Qty: 0 | Refills: 0 | DISCHARGE

## 2019-07-18 RX ORDER — LATANOPROST 0.05 MG/ML
1 SOLUTION/ DROPS OPHTHALMIC; TOPICAL
Qty: 0 | Refills: 0 | DISCHARGE

## 2019-07-18 RX ORDER — ENOXAPARIN SODIUM 100 MG/ML
30 INJECTION SUBCUTANEOUS
Qty: 0 | Refills: 0 | DISCHARGE
Start: 2019-07-18

## 2019-07-18 RX ORDER — DILTIAZEM HCL 120 MG
10 CAPSULE, EXT RELEASE 24 HR ORAL ONCE
Refills: 0 | Status: COMPLETED | OUTPATIENT
Start: 2019-07-18 | End: 2019-07-18

## 2019-07-18 RX ADMIN — TRAMADOL HYDROCHLORIDE 50 MILLIGRAM(S): 50 TABLET ORAL at 20:30

## 2019-07-18 RX ADMIN — Medication 2.5 MILLIGRAM(S): at 14:43

## 2019-07-18 RX ADMIN — ENOXAPARIN SODIUM 30 MILLIGRAM(S): 100 INJECTION SUBCUTANEOUS at 06:26

## 2019-07-18 RX ADMIN — Medication 800 MILLIGRAM(S): at 22:02

## 2019-07-18 RX ADMIN — Medication 0.12 MILLIGRAM(S): at 06:26

## 2019-07-18 RX ADMIN — Medication 0.25 MILLIGRAM(S): at 20:17

## 2019-07-18 RX ADMIN — PREGABALIN 1000 MICROGRAM(S): 225 CAPSULE ORAL at 14:42

## 2019-07-18 RX ADMIN — TRAMADOL HYDROCHLORIDE 50 MILLIGRAM(S): 50 TABLET ORAL at 21:10

## 2019-07-18 RX ADMIN — POLYETHYLENE GLYCOL 3350 17 GRAM(S): 17 POWDER, FOR SOLUTION ORAL at 14:43

## 2019-07-18 RX ADMIN — LIDOCAINE 1 PATCH: 4 CREAM TOPICAL at 20:18

## 2019-07-18 RX ADMIN — LATANOPROST 1 DROP(S): 0.05 SOLUTION/ DROPS OPHTHALMIC; TOPICAL at 20:18

## 2019-07-18 RX ADMIN — Medication 650 MILLIGRAM(S): at 06:30

## 2019-07-18 RX ADMIN — TRAMADOL HYDROCHLORIDE 50 MILLIGRAM(S): 50 TABLET ORAL at 06:30

## 2019-07-18 RX ADMIN — Medication 0.25 MILLIGRAM(S): at 14:43

## 2019-07-18 RX ADMIN — Medication 800 MILLIGRAM(S): at 14:42

## 2019-07-18 RX ADMIN — ENOXAPARIN SODIUM 30 MILLIGRAM(S): 100 INJECTION SUBCUTANEOUS at 18:23

## 2019-07-18 RX ADMIN — TRAMADOL HYDROCHLORIDE 50 MILLIGRAM(S): 50 TABLET ORAL at 06:24

## 2019-07-18 RX ADMIN — Medication 650 MILLIGRAM(S): at 14:44

## 2019-07-18 RX ADMIN — LIDOCAINE 1 PATCH: 4 CREAM TOPICAL at 14:42

## 2019-07-18 RX ADMIN — ATORVASTATIN CALCIUM 10 MILLIGRAM(S): 80 TABLET, FILM COATED ORAL at 14:41

## 2019-07-18 RX ADMIN — Medication 650 MILLIGRAM(S): at 06:26

## 2019-07-18 RX ADMIN — Medication 1 TABLET(S): at 14:42

## 2019-07-18 RX ADMIN — Medication 650 MILLIGRAM(S): at 22:02

## 2019-07-18 RX ADMIN — Medication 650 MILLIGRAM(S): at 15:37

## 2019-07-18 NOTE — DISCHARGE NOTE PROVIDER - PROVIDER TOKENS
FREE:[LAST:[Primary Medical Doctor],PHONE:[(   )    -],FAX:[(   )    -],FOLLOWUP:[1-3 days]],PROVIDER:[TOKEN:[[id number]],FOLLOWUP:[1 week]],PROVIDER:[TOKEN:[[id number]],FOLLOWUP:[1 week]] PROVIDER:[TOKEN:[[id number]],FOLLOWUP:[1 week]],PROVIDER:[TOKEN:[[id number]],FOLLOWUP:[1 week]],FREE:[LAST:[Primary Medical Doctor],PHONE:[(   )    -],FAX:[(   )    -],FOLLOWUP:[1-3 days]],PROVIDER:[TOKEN:[[id number]],FOLLOWUP:[1 week]]

## 2019-07-18 NOTE — PROGRESS NOTE ADULT - SUBJECTIVE AND OBJECTIVE BOX
CC:Patient is a 88y old  Female who presents with a chief complaint of sacral fracture with pelvic hematoma (18 Jul 2019 11:27)      Subjective:  Pt seen and examined at bedside with chaperone. Pt is AAOx3, pt in no acute distress. Pt states tolerated c/o sacral pain with meds. Pt denied c/o fever, chills, chest pain, SOB, abd pain, N/V/D, extremity pain or dysfunction, hemoptysis, hematemesis, hematuria, hematochexia, headache, diplopia, vertigo, dizzyness. Pt tolerating diet, (+) void, (+) ambulation with assistance, (+) bowel function, (+) jeffers for urinary retention    ROS:  sacral pain, otherwise as abovementioned ROS    Vital Signs Last 24 Hrs  T(C): 36.9 (18 Jul 2019 11:45), Max: 36.9 (18 Jul 2019 11:45)  T(F): 98.5 (18 Jul 2019 11:45), Max: 98.5 (18 Jul 2019 11:45)  HR: 72 (18 Jul 2019 11:45) (67 - 77)  BP: 111/70 (18 Jul 2019 11:45) (111/70 - 131/61)  BP(mean): --  RR: 18 (18 Jul 2019 11:45) (18 - 18)  SpO2: 98% (18 Jul 2019 11:45) (96% - 98%)    Labs:                                9.1    5.05  )-----------( 276      ( 18 Jul 2019 07:34 )             30.4     CBC Full  -  ( 18 Jul 2019 07:34 )  WBC Count : 5.05 K/uL  RBC Count : 3.72 M/uL  Hemoglobin : 9.1 g/dL  Hematocrit : 30.4 %  Platelet Count - Automated : 276 K/uL  Mean Cell Volume : 81.7 fl  Mean Cell Hemoglobin : 24.5 pg  Mean Cell Hemoglobin Concentration : 29.9 gm/dL  Auto Neutrophil # : x  Auto Lymphocyte # : x  Auto Monocyte # : x  Auto Eosinophil # : x  Auto Basophil # : x  Auto Neutrophil % : x  Auto Lymphocyte % : x  Auto Monocyte % : x  Auto Eosinophil % : x  Auto Basophil % : x                  Meds:  acetaminophen   Tablet .. 650 milliGRAM(s) Oral every 8 hours  atorvastatin 10 milliGRAM(s) Oral daily  bisacodyl 5 milliGRAM(s) Oral daily PRN  calcium carbonate 1250 mG  + Vitamin D (OsCal 500 + D) 1 Tablet(s) Oral daily  cyanocobalamin 1000 MICROGram(s) Oral daily  digoxin     Tablet 0.125 milliGRAM(s) Oral daily  docusate sodium 100 milliGRAM(s) Oral two times a day  enoxaparin Injectable 30 milliGRAM(s) SubCutaneous every 12 hours  ibuprofen  Tablet. 800 milliGRAM(s) Oral three times a day  latanoprost 0.005% Ophthalmic Solution 1 Drop(s) Right EYE at bedtime  lidocaine   Patch 1 Patch Transdermal daily  morphine  - Injectable 4 milliGRAM(s) SubCutaneous every 4 hours PRN  ondansetron Injectable 4 milliGRAM(s) IV Push every 6 hours PRN  polyethylene glycol 3350 17 Gram(s) Oral daily  senna 2 Tablet(s) Oral at bedtime  traMADol 50 milliGRAM(s) Oral every 6 hours PRN      Radiology:      Physical exam:  GCS of 15  Pt is aaox3  Pt in no acute distress  Airway is patent  Breathing is symmetric and unlabored  Neuro: CN II-XII grossly intact  Psych: normal affect  HEENT: normocephalic, no gross craniofacial bony pathology to exam  Neck: No tracheal deviation, no JVD, no crepitus, no ecchymosis, no hematoma  Chest: No gross rib or sternal pathology or tenderness to exam, no crepitus, no ecchymosis, no hematoma  Resp: CTAB  CVS: S1S2(+)  ABD: bowel sounds (+), soft, nontender, non distended, no rebound, no guarding, no rigidity, no pelvic instability to exam  EXT: (+) sacral pain associated with known sacral fracture. no calf tenderness or edema to exam b/l, pt has good capillary refill in all digits. Sensoromotor function grossly intact to limited exam, on VTE prophylaxis  Skin: no adverse skin changes to exam    Jeffers for urinary retention

## 2019-07-18 NOTE — DISCHARGE NOTE PROVIDER - CARE PROVIDER_API CALL
Primary Medical Doctor,   Phone: (   )    -  Fax: (   )    -  Follow Up Time: 1-3 days    Filippo Mixon)  Orthopaedic Surgery  [address] [facility], [address] [place] [address]  Phone: [phone]  Fax: [phone]  Follow Up Time: 1 week    Humberto Perales)  Cardiovascular Disease; Interventional Cardiology  [address] [place] [address]  Phone: [phone]  Fax: [phone]  Follow Up Time: 1 week Filippo Mixon (MD)  Orthopaedic Surgery  [address] [facility], [address] [place] [address]  Phone: [phone]  Fax: [phone]  Follow Up Time: 1 week    Humberto Perales (MD)  Cardiovascular Disease; Interventional Cardiology  [address] [place] [address]  Phone: [phone]  Fax: [phone]  Follow Up Time: 1 week    Primary Medical Doctor,   Phone: (   )    -  Fax: (   )    -  Follow Up Time: 1-3 days    Sandip Manriquez)  Urology  [address] [facility], [address] [place] [address]  Phone: [phone]  Fax: [phone]  Follow Up Time: 1 week

## 2019-07-18 NOTE — PROGRESS NOTE ADULT - SUBJECTIVE AND OBJECTIVE BOX
Patient is a 88y old  Female who presents with a chief complaint of sacral fracture with pelvic hematoma (17 Jul 2019 17:00)    7/18- lying flat denies complaints    MEDICATIONS  (STANDING):  acetaminophen   Tablet .. 650 milliGRAM(s) Oral every 8 hours  atorvastatin 10 milliGRAM(s) Oral daily  calcium carbonate 1250 mG  + Vitamin D (OsCal 500 + D) 1 Tablet(s) Oral daily  cyanocobalamin 1000 MICROGram(s) Oral daily  digoxin     Tablet 0.125 milliGRAM(s) Oral daily  docusate sodium 100 milliGRAM(s) Oral two times a day  enoxaparin Injectable 30 milliGRAM(s) SubCutaneous every 12 hours  ibuprofen  Tablet. 800 milliGRAM(s) Oral three times a day  latanoprost 0.005% Ophthalmic Solution 1 Drop(s) Right EYE at bedtime  lidocaine   Patch 1 Patch Transdermal daily  polyethylene glycol 3350 17 Gram(s) Oral daily  senna 2 Tablet(s) Oral at bedtime    MEDICATIONS  (PRN):  bisacodyl 5 milliGRAM(s) Oral daily PRN Constipation  morphine  - Injectable 4 milliGRAM(s) SubCutaneous every 4 hours PRN Severe Pain (7 - 10)  ondansetron Injectable 4 milliGRAM(s) IV Push every 6 hours PRN Nausea and/or Vomiting  traMADol 50 milliGRAM(s) Oral every 6 hours PRN Moderate Pain (4 - 6)            Vital Signs Last 24 Hrs  T(C): 36.2 (18 Jul 2019 05:51), Max: 36.7 (17 Jul 2019 21:05)  T(F): 97.1 (18 Jul 2019 05:51), Max: 98.1 (17 Jul 2019 21:05)  HR: 67 (18 Jul 2019 05:51) (67 - 95)  BP: 120/68 (18 Jul 2019 05:51) (120/68 - 152/87)  BP(mean): --  RR: 18 (18 Jul 2019 05:51) (18 - 18)  SpO2: 96% (18 Jul 2019 05:51) (96% - 97%)            INTERPRETATION OF TELEMETRY:  SR with PACs  ECG:        LABS:                        9.1    5.05  )-----------( 276      ( 18 Jul 2019 07:34 )             30.4                   I&O's Summary    17 Jul 2019 07:01  -  18 Jul 2019 07:00  --------------------------------------------------------  IN: 0 mL / OUT: 800 mL / NET: -800 mL      BNP  RADIOLOGY & ADDITIONAL STUDIES:

## 2019-07-18 NOTE — PROGRESS NOTE ADULT - SUBJECTIVE AND OBJECTIVE BOX
PCP: Dr. Olmos  Reason for admission: fall, back pain    HPI: 88F, pmh of HTn, HLD, Osteoporosis, Osteoarthritis, left eye blindness, urinary incontinence, glaucoma who presented to the ER with back pain. She had fallen 7/11 after slipping from her chair while working,  seen in ed and was able to ambulate. Preliminary imaging was negative and she was discharged home. She was called later that upon further review she was noted to have a sacral fracture. The following day, her pain worsened and was unable to get out of bed on her own so she returned to Ed. She was admitted to trauma. Transfused prbcs for acute blood loss anemia from presacral hematoma.  Hospitalist service now consulted for medical comanagement. Hospital course notable for new onset paroxysmal atrial fibrillation.   Also with urinary retention requiring jeffers    7/17: pt seen and examined. Feeling better. Pain controlled when laying flat. Did not get out of bed today.   7/18- pt doing well. no CP/SOB     ROS: all 10 systems reviewed and is as above otherwise negative.     Vital Signs Last 24 Hrs  T(C): 36.9 (18 Jul 2019 11:45), Max: 36.9 (18 Jul 2019 11:45)  T(F): 98.5 (18 Jul 2019 11:45), Max: 98.5 (18 Jul 2019 11:45)  HR: 72 (18 Jul 2019 11:45) (67 - 77)  BP: 111/70 (18 Jul 2019 11:45) (111/70 - 131/61)  BP(mean): --  RR: 18 (18 Jul 2019 11:45) (18 - 18)  SpO2: 98% (18 Jul 2019 11:45)     GEN: thin elderly woman lying in bed, NAD  HEENT:   NC/AT. L eye blindness, R eye EOMI  CV:  +S1, +S2, RRR  RESP:   lungs clear to auscultation bilaterally, no wheeze, rales, rhonchi   BREAST:  not examined  GI:  abdomen soft, non-tender, non-distended, normoactive BS  RECTAL:  not examined  :  not examined  MSK:   normal muscle tone  EXT:  no edema, + OA in b/l hands, RUE ecchymosis  NEURO:  AAOX3, no focal neurological deficits  SKIN:  no rashes                        9.1    5.05  )-----------( 276      ( 18 Jul 2019 07:34 )             30.4     CT Cervical Spine No Cont (07.11.19 @ 17:27) >  IMPRESSION:   No vertebral fracture is recognized.  Multilevel   degenerative changes of the cervical spine are present.    CT Pelvis Bony Only No Cont (07.11.19 @ 17:26) >  IMPRESSION:   Minimally displaced transversely oriented fracture of the sacrum at the   S2-3 level with adjacent muscular edema and hematoma.    This represents a change in interpretation from the preliminary report.   This was communicated electronically with the emergency room providers as   per departmental protocol. PCP: Dr. Olmos  Reason for admission: fall, back pain    HPI: 88F, pmh of HTn, HLD, Osteoporosis, Osteoarthritis, left eye blindness, urinary incontinence, glaucoma who presented to the ER with back pain. She had fallen 7/11 after slipping from her chair while working,  seen in ed and was able to ambulate. Preliminary imaging was negative and she was discharged home. She was called later that upon further review she was noted to have a sacral fracture. The following day, her pain worsened and was unable to get out of bed on her own so she returned to Ed. She was admitted to trauma. Transfused prbcs for acute blood loss anemia from presacral hematoma.  Hospitalist service now consulted for medical comanagement. Hospital course notable for new onset paroxysmal atrial fibrillation.   Also with urinary retention requiring jeffers    7/17: pt seen and examined. Feeling better. Pain controlled when laying flat. Did not get out of bed today.   7/18- pt doing well. no CP/SOB     ROS: all 10 systems reviewed and is as above otherwise negative.     Vital Signs Last 24 Hrs  T(C): 36.9 (18 Jul 2019 11:45), Max: 36.9 (18 Jul 2019 11:45)  T(F): 98.5 (18 Jul 2019 11:45), Max: 98.5 (18 Jul 2019 11:45)  HR: 72 (18 Jul 2019 11:45) (67 - 77)  BP: 111/70 (18 Jul 2019 11:45) (111/70 - 131/61)  BP(mean): --  RR: 18 (18 Jul 2019 11:45) (18 - 18)  SpO2: 98% (18 Jul 2019 11:45)     GEN: thin elderly woman lying in bed, NAD  HEENT:   NC/AT. L eye blindness, R eye EOMI  CV:  +S1, +S2, RRR  RESP:   lungs clear to auscultation bilaterally, no wheeze, rales, rhonchi   BREAST:  not examined  GI:  abdomen soft, non-tender, non-distended, normoactive BS  RECTAL:  not examined  : jeffers in place   MSK:   normal muscle tone  EXT:  no edema, + OA in b/l hands, RUE ecchymosis  NEURO: no focal neurological deficits  SKIN:  no rashes                        9.1    5.05  )-----------( 276      ( 18 Jul 2019 07:34 )             30.4     CT Cervical Spine No Cont (07.11.19 @ 17:27) >  IMPRESSION:   No vertebral fracture is recognized.  Multilevel   degenerative changes of the cervical spine are present.    CT Pelvis Bony Only No Cont (07.11.19 @ 17:26) >  IMPRESSION:   Minimally displaced transversely oriented fracture of the sacrum at the   S2-3 level with adjacent muscular edema and hematoma.    This represents a change in interpretation from the preliminary report.   This was communicated electronically with the emergency room providers as   per departmental protocol.

## 2019-07-18 NOTE — DISCHARGE NOTE PROVIDER - HOSPITAL COURSE
Pt with sacral fracture, presacral hematoma, new onset afib, s/p trauma/fall. Pt under care of medical/cardiac/trauma specialist. Pt stable and cleared for d/c from relevant specialists with appropriate outpatient follow up care/precautions/instructions Pt with sacral fracture, presacral hematoma, new onset afib, s/p trauma/fall. Pt under care of medical/cardiac/trauma specialist. Pt stable and cleared for d/c from relevant specialists with appropriate outpatient follow up care/precautions/instructions        Pt has jeffers for urinary retention Pt with sacral fracture, presacral hematoma, new onset afib, s/p trauma/fall. Pt under care of medical/cardiac/trauma specialist. Pt stable and cleared for d/c from relevant specialists with appropriate outpatient follow up care/precautions/instructions        Pt has jeffers for urinary retention and void trial at rehab. outpt f/u with trauma dr veloz after rehab.          pt had rapid response for rapid afib with RVR on 7/18 on tele.  back in NSR on 7/19 and started on amiodarone load 400 mg bid for 2 more days and then 200 mg daily starting 7/25/19 with outpatient followup with cardio dr borrego after rehab. no anticoagulation due to recent trauma and sacral fx with hematoma. tele - nsr with ectopy on monitor         # Mechanical fall, sacral fracture/hematoma, inability to ambulate:    - outpt f/u with dr veloz after rehab    - pain meds prn    - lidoderm patch    -seen by ortho, wbat    -physical therapy     -incentive spirometry    -bowel regimen        # Acute blood loss anemia:    - due to presacral hematoma.    - transfused prbcs    - monitor h/h        # Urinary retention/constipation/dehydration:-    -jeffers placed     -should improve with mobilization    -would dc with jeffers and have TOV @ Rehab once more mobile in 2-3 days post discharge        # H/o HTN:    -continue to hold arb for now.        # PAF: now in NSR    - amio load 400 mg bid til 7/24 then 200 mg daily starting 7/25        # HLD:    -statin        # DVT px.

## 2019-07-18 NOTE — DISCHARGE NOTE PROVIDER - NSDCFUADDINST_GEN_ALL_CORE_FT
Take all appropriate fall risk precautions  Please seek immediate medical attention for chest pain, shortness of breath, any adverse changes to health  Please follow up with cardiologist, spine surgeon, and your primary medical doctor as instructed Take all appropriate fall risk precautions  Please seek immediate medical attention for chest pain, shortness of breath, any adverse changes to health    Please follow up with urologist for jeffers cather removal/evaluation of urniary retention  Please follow up with cardiologist, spine surgeon, and your primary medical doctor as instructed

## 2019-07-18 NOTE — PROGRESS NOTE ADULT - ASSESSMENT
ASSESSMENT AND PLAN:  88f, PMH as above a/w:    # Mechanical fall, sacral fracture/hematoma, inability to ambulate:  - continue morphine /tramadol for pain  - lidoderm patch  -seen by ortho, wbat  -physical therapy   -monitor h/h  -incentive spirometry  -bowel regimen    # Acute blood loss anemia:  -due to presacral hematoma.  -tranfused prbcs  -monitor h/h (hb 9.1 on 7/18 from 6.3 on 7/13)    # Urinary retention/consipation/dehydration:-  -s/p IVF  -encourage po fluids.   -added miralax/senna to colace  -jeffers placed   -should improve with mobilization  -would dc with jeffers and have TOV @ Rehab once more mobile in 2-3 days post discharge    # H/o HTN:  -bp stable at 111/70 on 7/18 11:45am  -continue to hold arb for now.    # PAF:  -new onset  -echo with mild mr/trace AI, moderate TR, preserved LVEF, mildly dilated LA  -cardio consulted  -hold off on a/c for now given hematoma, fall risk  -7/16 with rapid a fib, sustained, improved with iv and po cardizem, but unable to get cardizem regularly d/t blood pressure  -dc cardizem. start dig 0.125 mg daily, check levels in next few days.    # HLD:  -statin    # DVT px.     # Dispo:   if stable, dc to rehab with jeffers (patient prefers [facility]) ASSESSMENT AND PLAN:  88f, PMH as above a/w:    # Mechanical fall, sacral fracture/hematoma, inability to ambulate:  - plan as per trauma surgery  - continue morphine /tramadol for pain  - lidoderm patch  -seen by ortho, wbat  -physical therapy   -monitor h/h  -incentive spirometry  -bowel regimen    # Acute blood loss anemia:  - due to presacral hematoma.  - transfused prbcs  - monitor h/h (hb 9.1 on 7/18 from 6.3 on 7/13)    # Urinary retention/constipation/dehydration:-  -s/p IVF  -encourage po fluids.   -added miralax/senna to colace  -jeffers placed   -should improve with mobilization  -would dc with jeffers and have TOV @ Rehab once more mobile in 2-3 days post discharge    # H/o HTN:  -bp stable at 111/70 on 7/18 11:45am  -continue to hold arb for now.    # PAF:  -new onset  -echo with mild mr/trace AI, moderate TR, preserved LVEF, mildly dilated LA  -cardio consulted  -hold off on a/c for now given hematoma, fall risk  -7/16 with rapid a fib, sustained, improved with iv and po cardizem, but unable to get cardizem regularly d/t blood pressure  -dc cardizem. start dig 0.125 mg daily, check levels in next few days.    # HLD:  -statin    # DVT px.     # Dispo:   if stable, dc to rehab with jeffers (patient prefers [facility])

## 2019-07-18 NOTE — PROVIDER CONTACT NOTE (OTHER) - REASON
call back Spine
call back Trauma
hypotension, rapid afib
pt HR sustaining 170 hypotensive SBP 92
Cardiology Consult

## 2019-07-18 NOTE — CHART NOTE - NSCHARTNOTEFT_GEN_A_CORE
RRT Call for patient in AfibRVR with Hypotension.    88F presented to ED with back pain admitted for sacral fracture, now seen in the room with Afib RVR. AfibRVR is a new diagnosis for the patient. Patient is without complaints, NAD, Afebrile. She denies SOB, CP Palpitations.    ROS As stated above    VS  Pulse 168 bpm  BP 88/49  RR 16 sat 98% RA    Gen: Frail appearing, NAD, lying in bed without pain  CVS: S1/S2+ no murmurs appreciated, Rapid Irregularly irregular rhythm  Pulm: CVA B/L  Pulses: Rapid in radial pulse    a/p: 88F in Rapid afib and Hypotension  - Digoxin 0.25mg STAT  - 500mL NS STAT    case dw Dr. Gupta

## 2019-07-18 NOTE — PROGRESS NOTE ADULT - SUBJECTIVE AND OBJECTIVE BOX
episode of rapid AFib noted now better rate controlled after IV dig and IV lopressor , SBP 92 on my measurement . Asymtpomatic   1) continue dig load but standing dose should only be 0.125 mg daily given  advanced age   2) as long as SBP > 90 can also use IV lopressor Q6 prn for HR gretaer than 120   3) if she converts back to NSR would try AMIO to maintain NSR to avoid this in the future   4) hold all other antihypertensives and can give boluses of IV NS if needed for hypotension

## 2019-07-18 NOTE — DISCHARGE NOTE NURSING/CASE MANAGEMENT/SOCIAL WORK - NSDCDPATPORTLINK_GEN_ALL_CORE
You can access the [facility] Portal, offered by [facility], by registering with the following website: [email or web address]

## 2019-07-18 NOTE — PROVIDER CONTACT NOTE (OTHER) - ACTION/TREATMENT ORDERED:
Consult called in with answering service for am. Spoke with Susanne
Dr. Alan aware, will review and submit further orders
MD order followed.

## 2019-07-18 NOTE — DISCHARGE NOTE PROVIDER - NSDCCPCAREPLAN_GEN_ALL_CORE_FT
PRINCIPAL DISCHARGE DIAGNOSIS  Diagnosis: Closed fracture of sacrum, unspecified portion of sacrum, sequela  Assessment and Plan of Treatment:       SECONDARY DISCHARGE DIAGNOSES  Diagnosis: Afib  Assessment and Plan of Treatment:     Diagnosis: Anemia  Assessment and Plan of Treatment:

## 2019-07-18 NOTE — PROGRESS NOTE ADULT - ASSESSMENT
A/P:  Sacral fracture  Pre Sacral hematoma  Physical therapy   for d/c planning  Cardiology on consult for Afib, hold antihypertensives, no anticoagulation, outpt follow up  Hospitalist on consult for medical management  Ortho spine on consult, no intervention  Urinary retention, outpt follow up with Urology for jeffers cath evaluation/removal  GI/DVT prophylaxis  Pain control  Fall risk precautions  Cont current care and meds  Pt stable from trauma surgical standpoint  D/C planning  Pt aware of and agrees with all of the above

## 2019-07-18 NOTE — DISCHARGE NOTE PROVIDER - CARE PROVIDERS DIRECT ADDRESSES
,DirectAddress_Unknown,DirectAddress_Unknown,[email or web address] ,DirectAddress_Unknown,[email or web address],DirectAddress_Unknown,[email or web address]

## 2019-07-18 NOTE — DISCHARGE NOTE PROVIDER - NSDCPNSUBOBJ_GEN_ALL_CORE
CP: Dr. Olmos    Reason for admission: fall, back pain        HPI: 88F, pmh of HTn, HLD, Osteoporosis, Osteoarthritis, left eye blindness, urinary incontinence, glaucoma who presented to the ER with back pain. She had fallen 7/11 after slipping from her chair while working,  seen in ed and was able to ambulate. Preliminary imaging was negative and she was discharged home. She was called later that upon further review she was noted to have a sacral fracture. The following day, her pain worsened and was unable to get out of bed on her own so she returned to Ed. She was admitted to trauma. Transfused prbcs for acute blood loss anemia from presacral hematoma.  Hospitalist service now consulted for medical comanagement. Hospital course notable for new onset paroxysmal atrial fibrillation.     Also with urinary retention requiring jeffers        7/17: pt seen and examined. Feeling better. Pain controlled when laying flat. Did not get out of bed today.     7/18- pt doing well. no CP/SOB     7/19- pt complaining of burping and nausea from PO meds. no episodes of emesis. tele - NSR with sinus arrythmia     7/20- pt alert. states she's been having some posterior RLE pain but resolving with pain meds. nausea improved today. no CP or SOB. son updated at bedside     7/21- pt doing well. no acute complaints.     7/22 - doing well no events. pt nsr with ectopy on tele monitor         ROS: all 10 systems reviewed and is as above otherwise negative.         Vital Signs Last 24 Hrs    T(C): 36.4 (22 Jul 2019 12:19), Max: 36.6 (21 Jul 2019 19:52)    T(F): 97.6 (22 Jul 2019 12:19), Max: 97.8 (21 Jul 2019 19:52)    HR: 57 (22 Jul 2019 12:19) (55 - 66)    BP: 110/76 (22 Jul 2019 12:19) (110/76 - 132/59)    BP(mean): --    RR: 17 (22 Jul 2019 12:19) (16 - 18)    SpO2: 99% (22 Jul 2019 12:19) (96% - 100%)        GEN: thin elderly woman lying in bed, NAD    HEENT:   NC/AT. L eye blindness, R eye EOMI    CV:  +S1, +S2, rrr    RESP:   lungs clear to auscultation bilaterally, no wheeze, rales, rhonchi     BREAST:  not examined    GI:  abdomen soft, non-tender, non-distended, normoactive BS    RECTAL:  not examined    : jeffers in place     MSK:   normal muscle tone    EXT:  no edema, + OA in b/l hands    NEURO: no focal neurological deficits    SKIN:  no rashes                 Labs: no new labwork since 7/19        CT Cervical Spine No Cont (07.11.19 @ 17:27) >    IMPRESSION:   No vertebral fracture is recognized.  Multilevel     degenerative changes of the cervical spine are present.        CT Pelvis Bony Only No Cont (07.11.19 @ 17:26) >    IMPRESSION:     Minimally displaced transversely oriented fracture of the sacrum at the     S2-3 level with adjacent muscular edema and hematoma.        This represents a change in interpretation from the preliminary report.     This was communicated electronically with the emergency room providers as     per departmental protocol.        Assessment and Plan:     · Assessment	    ASSESSMENT AND PLAN:    88f, PMH as above a/w:        # Mechanical fall, sacral fracture/hematoma, inability to ambulate:    - outpt f/u with dr veloz after rehab    - pain meds prn    - lidoderm patch    -seen by ortho, wbat    -physical therapy     -incentive spirometry    -bowel regimen        # Acute blood loss anemia:    - due to presacral hematoma.    - transfused prbcs    - monitor h/h        # Urinary retention/constipation/dehydration:-    -jeffers placed     -should improve with mobilization    -would dc with jeffers and have TOV @ Rehab once more mobile in 2-3 days post discharge        # H/o HTN:    -continue to hold arb for now.        # PAF: now in NSR    - amio load 400 mg bid til 7/24 then 200 mg daily starting 7/25            # HLD:    -statin        # DVT px.

## 2019-07-18 NOTE — DISCHARGE NOTE PROVIDER - NSDCACTIVITY_GEN_ALL_CORE
Do not make important decisions/Do not drive or operate machinery/Walking - Indoors allowed/No heavy lifting/straining/Walking - Outdoors allowed/Stairs allowed

## 2019-07-19 LAB
ANION GAP SERPL CALC-SCNC: 10 MMOL/L — SIGNIFICANT CHANGE UP (ref 5–17)
BUN SERPL-MCNC: 14 MG/DL — SIGNIFICANT CHANGE UP (ref 7–23)
CALCIUM SERPL-MCNC: 7.3 MG/DL — LOW (ref 8.5–10.1)
CHLORIDE SERPL-SCNC: 104 MMOL/L — SIGNIFICANT CHANGE UP (ref 96–108)
CO2 SERPL-SCNC: 22 MMOL/L — SIGNIFICANT CHANGE UP (ref 22–31)
CREAT SERPL-MCNC: 0.36 MG/DL — LOW (ref 0.5–1.3)
GLUCOSE SERPL-MCNC: 69 MG/DL — LOW (ref 70–99)
HCT VFR BLD CALC: 31.2 % — LOW (ref 34.5–45)
HGB BLD-MCNC: 9.3 G/DL — LOW (ref 11.5–15.5)
MAGNESIUM SERPL-MCNC: 1.9 MG/DL — SIGNIFICANT CHANGE UP (ref 1.6–2.6)
MCHC RBC-ENTMCNC: 24.8 PG — LOW (ref 27–34)
MCHC RBC-ENTMCNC: 29.8 GM/DL — LOW (ref 32–36)
MCV RBC AUTO: 83.2 FL — SIGNIFICANT CHANGE UP (ref 80–100)
PHOSPHATE SERPL-MCNC: 3.5 MG/DL — SIGNIFICANT CHANGE UP (ref 2.5–4.5)
PLATELET # BLD AUTO: 289 K/UL — SIGNIFICANT CHANGE UP (ref 150–400)
POTASSIUM SERPL-MCNC: 3.9 MMOL/L — SIGNIFICANT CHANGE UP (ref 3.5–5.3)
POTASSIUM SERPL-SCNC: 3.9 MMOL/L — SIGNIFICANT CHANGE UP (ref 3.5–5.3)
RBC # BLD: 3.75 M/UL — LOW (ref 3.8–5.2)
RBC # FLD: 17.8 % — HIGH (ref 10.3–14.5)
SODIUM SERPL-SCNC: 136 MMOL/L — SIGNIFICANT CHANGE UP (ref 135–145)
WBC # BLD: 7.36 K/UL — SIGNIFICANT CHANGE UP (ref 3.8–10.5)
WBC # FLD AUTO: 7.36 K/UL — SIGNIFICANT CHANGE UP (ref 3.8–10.5)

## 2019-07-19 RX ORDER — AMIODARONE HYDROCHLORIDE 400 MG/1
400 TABLET ORAL
Refills: 0 | Status: DISCONTINUED | OUTPATIENT
Start: 2019-07-19 | End: 2019-07-22

## 2019-07-19 RX ADMIN — TRAMADOL HYDROCHLORIDE 50 MILLIGRAM(S): 50 TABLET ORAL at 21:43

## 2019-07-19 RX ADMIN — Medication 800 MILLIGRAM(S): at 13:03

## 2019-07-19 RX ADMIN — SODIUM CHLORIDE 75 MILLILITER(S): 9 INJECTION INTRAMUSCULAR; INTRAVENOUS; SUBCUTANEOUS at 22:00

## 2019-07-19 RX ADMIN — Medication 1 TABLET(S): at 13:03

## 2019-07-19 RX ADMIN — AMIODARONE HYDROCHLORIDE 400 MILLIGRAM(S): 400 TABLET ORAL at 18:13

## 2019-07-19 RX ADMIN — Medication 800 MILLIGRAM(S): at 06:03

## 2019-07-19 RX ADMIN — TRAMADOL HYDROCHLORIDE 50 MILLIGRAM(S): 50 TABLET ORAL at 22:30

## 2019-07-19 RX ADMIN — ONDANSETRON 4 MILLIGRAM(S): 8 TABLET, FILM COATED ORAL at 10:07

## 2019-07-19 RX ADMIN — POLYETHYLENE GLYCOL 3350 17 GRAM(S): 17 POWDER, FOR SOLUTION ORAL at 13:02

## 2019-07-19 RX ADMIN — ENOXAPARIN SODIUM 30 MILLIGRAM(S): 100 INJECTION SUBCUTANEOUS at 06:03

## 2019-07-19 RX ADMIN — TRAMADOL HYDROCHLORIDE 50 MILLIGRAM(S): 50 TABLET ORAL at 13:02

## 2019-07-19 RX ADMIN — ATORVASTATIN CALCIUM 10 MILLIGRAM(S): 80 TABLET, FILM COATED ORAL at 13:03

## 2019-07-19 RX ADMIN — Medication 0.25 MILLIGRAM(S): at 01:34

## 2019-07-19 RX ADMIN — Medication 0.12 MILLIGRAM(S): at 06:03

## 2019-07-19 RX ADMIN — LIDOCAINE 1 PATCH: 4 CREAM TOPICAL at 01:33

## 2019-07-19 RX ADMIN — ENOXAPARIN SODIUM 30 MILLIGRAM(S): 100 INJECTION SUBCUTANEOUS at 18:13

## 2019-07-19 RX ADMIN — Medication 650 MILLIGRAM(S): at 13:02

## 2019-07-19 RX ADMIN — PREGABALIN 1000 MICROGRAM(S): 225 CAPSULE ORAL at 13:03

## 2019-07-19 RX ADMIN — Medication 650 MILLIGRAM(S): at 06:02

## 2019-07-19 RX ADMIN — LATANOPROST 1 DROP(S): 0.05 SOLUTION/ DROPS OPHTHALMIC; TOPICAL at 21:44

## 2019-07-19 NOTE — PROGRESS NOTE ADULT - SUBJECTIVE AND OBJECTIVE BOX
Cardiology Progress Note    HPI: 88F, pmh of HTn, HLD, Osteoporosis, Osteoarthritis, left eye blindness, urinary incontinence, glaucoma who presented to the ER with back pain. She had fallen 7/11 after slipping from her chair while working,  seen in ed and was able to ambulate. Preliminary imaging was negative and she was discharged home. She was called later that upon further review she was noted to have a sacral fracture. The following day, her pain worsened and was unable to get out of bed on her own so she returned to Ed. She was admitted to trauma. Transfused prbcs for acute blood loss anemia from presacral hematoma.  Hospitalist service now consulted for medical comanagement.    7/16. No events last pm. No CP/SOB. Lying flat in bed. Pt with diffuse body pain.  Tele- SR with APCs.     7/17. No events last pm. Lying in bed, not moving much. No CP. Feels weak/tired.     7/19. No CP/SOB. Not moving much. Lying flat in bed.  Currently in SR, APCs/PVCs.    ROS: all 10 systems reviewed and is as above otherwise negative.     Vital Signs Last 24 Hrs  T(C): 36.5 (15 Jul 2019 09:36), Max: 36.6 (14 Jul 2019 16:26)  T(F): 97.7 (15 Jul 2019 09:36), Max: 97.9 (14 Jul 2019 20:59)  HR: 81 (15 Jul 2019 14:00) (66 - 113)  BP: 101/56 (15 Jul 2019 14:00) (100/60 - 130/59)  BP(mean): 68 (15 Jul 2019 14:00) (65 - 81)  RR: 11 (15 Jul 2019 14:00) (11 - 27)  SpO2: 94% (15 Jul 2019 14:00) (92% - 98%)    PHYSICAL EXAM:    GENERAL: Elderly female, thin appearing,  Comfortable, no acute distress   HEAD:  Normocephalic, atraumatic  EYES: Left eye blindness. Right eye-eomi  HEENT: Moist mucous membranes  NECK: Supple, No JVD  NERVOUS SYSTEM:  Alert & Oriented X3, non focal  CHEST/LUNG: Clear to auscultation bilaterally  HEART: Regular rate and rhythm  ABDOMEN: Soft, Nontender, Nondistended, Bowel sounds present  GENITOURINARY: Voiding, no palpable bladder  EXTREMITIES:   No clubbing, cyanosis, or edema  MUSCULOSKELETAL- . +OA b/l  hands  SKIN-no rash    LABS:                        9.1    8.92  )-----------( 223      ( 15 Jul 2019 06:33 )             29.7     07-14    134<L>  |  104  |  16  ----------------------------<  82  4.0   |  24  |  0.50    Ca    7.5<L>      14 Jul 2019 06:32  Phos  3.9     07-14  Mg     2.2     07-14        CT Cervical Spine No Cont (07.11.19 @ 17:27) >  IMPRESSION:   No vertebral fracture is recognized.  Multilevel   degenerative changes of the cervical spine are present.    CT Pelvis Bony Only No Cont (07.11.19 @ 17:26) >  IMPRESSION:   Minimally displaced transversely oriented fracture of the sacrum at the   S2-3 level with adjacent muscular edema and hematoma.    This represents a change in interpretation from the preliminary report.   This was communicated electronically with the emergency room providers as   per departmental protocol.    MEDICATIONS  (STANDING):  acetaminophen   Tablet .. 650 milliGRAM(s) Oral every 8 hours  atorvastatin 10 milliGRAM(s) Oral daily  calcium carbonate 1250 mG  + Vitamin D (OsCal 500 + D) 1 Tablet(s) Oral daily  cyanocobalamin 1000 MICROGram(s) Oral daily  docusate sodium 100 milliGRAM(s) Oral two times a day  enoxaparin Injectable 30 milliGRAM(s) SubCutaneous every 12 hours  ibuprofen  Tablet. 800 milliGRAM(s) Oral three times a day  latanoprost 0.005% Ophthalmic Solution 1 Drop(s) Right EYE at bedtime  lidocaine   Patch 1 Patch Transdermal daily    MEDICATIONS  (PRN):  bisacodyl 5 milliGRAM(s) Oral daily PRN Constipation  morphine  - Injectable 4 milliGRAM(s) SubCutaneous every 4 hours PRN Severe Pain (7 - 10)  ondansetron Injectable 4 milliGRAM(s) IV Push every 6 hours PRN Nausea and/or Vomiting  traMADol 50 milliGRAM(s) Oral every 6 hours PRN Moderate Pain (4 - 6)    < from: Transthoracic Echocardiogram (07.15.19 @ 11:27) >  Left ventricle systolic function appears preserved based on difficult   transthoracic views; segmental wall motion abnormalities can not be rule   out.   The left ventricle is normal in size.   Visual estimation of left ventricle ejection fraction is 55-60%.   The left atrium is mildly dilated.   Normal appearing right ventricle structure and function.     The aortic valve is well visualized, appears mildly sclerotic. Valve   opening seems to be normal.   Trace aortic regurgitation is present.   Fibrocalcific changes noted to the mitral valve leaflets with preserved   leaflet excursion.   Posterior mitral annular calcification noted.   Trace to mild mitral regurgitation is present.   The tricuspid valve leaflets appear mildly thickened and/or calcified,   but   open well.   Mild to moderate Tricuspid regurgitation is present. Mild pulmonary   hypertension.     No evidence of pericardial effusion.      < end of copied text >

## 2019-07-19 NOTE — PROGRESS NOTE ADULT - ASSESSMENT
ASSESSMENT AND PLAN:  88f, PMH as above a/w:    # Mechanical fall, sacral fracture/hematoma, inability to ambulate:  - plan as per trauma surgery  - continue morphine /tramadol for pain  - lidoderm patch  -seen by ortho, wbat  -physical therapy   -monitor h/h  -incentive spirometry  -bowel regimen    # Acute blood loss anemia:  - due to presacral hematoma.  - transfused prbcs  - monitor h/h (hb 9.1 on 7/18 from 6.3 on 7/13)    # Urinary retention/constipation/dehydration:-  -s/p IVF  -encourage po fluids.   -added miralax/senna to colace  -jeffers placed   -should improve with mobilization  -would dc with jeffers and have TOV @ Rehab once more mobile in 2-3 days post discharge    # H/o HTN:  -bp stable at 124/66  on 7/19 11:38am  -continue to hold arb for now.    # PAF:  -new onset  -echo with mild mr/trace AI, moderate TR, preserved LVEF, mildly dilated LA  -cardio consulted  -hold off on a/c for now given hematoma, fall risk  -7/16 with rapid a fib, sustained, improved with iv and po cardizem, but unable to get cardizem regularly d/t blood pressure  -dc cardizem. dc dig 0.125 mg. start Amio 400mg BID x 5 days,   then 200mg daily as per cardio. Check LFTs, PFTs, TFTs.    # HLD:  -statin    # DVT px.     # Dispo:   if stable, dc to rehab with jeffers (patient prefers [facility]) ASSESSMENT AND PLAN:  88f, PMH as above a/w:    # Mechanical fall, sacral fracture/hematoma, inability to ambulate:  - plan as per trauma surgery  - continue morphine/tramadol for pain  - lidoderm patch  -seen by ortho, wbat  -physical therapy   -monitor h/h  -incentive spirometry  -bowel regimen    # Acute blood loss anemia:  - due to presacral hematoma.  - transfused prbcs  - monitor h/h (hb 9.1 on 7/18 from 6.3 on 7/13)    # Urinary retention/constipation/dehydration:-  -s/p IVF  -encourage po fluids.   -added miralax/senna to colace  -jeffers placed   -should improve with mobilization  -would dc with jeffers and have TOV @ Rehab once more mobile in 2-3 days post discharge    # H/o HTN:  -bp stable at 124/66  on 7/19 11:38am  -continue to hold arb for now.    # PAF: now in NSR  - cardio input noted.  will stop dig and start amio load.  monitor on tele.    -echo with mild mr/trace AI, moderate TR, preserved LVEF, mildly dilated LA  -hold off on a/c for now given hematoma, fall risk  -7/16 with rapid a fib, sustained, improved with iv and po cardizem, but unable to get cardizem regularly d/t blood pressure  -dc cardizem. dc dig 0.125 mg. start Amio 400mg BID x 5 days,   then 200mg daily as per cardio. Check LFTs, PFTs, TFTs.    # HLD:  -statin    # DVT px.     # Dispo:   if stable, dc to rehab with jeffers (patient prefers [facility])

## 2019-07-19 NOTE — PROGRESS NOTE ADULT - ASSESSMENT
ASSESSMENT AND PLAN:  88f, PMH as above a/w:    1. Mechanical fall, sacral fracture/hematoma, inability to ambulate. Mgmt as per ortho, primary team.   Pain control, PT.     2. Atrial fibrillation. Presently in SR with ectopy. Will d/c Dig and start Amio 400mg BID x 5 days,   then 200mg daily. Check LFTs, PFTs, TFTs.  Hold on AV anson agents for now.   No LTA for now in setting of SR, recent fall.   Can have outpt monitoring.   2Decho- Nl LV fxn, mild MR, mild to mod TR.    3. Acute blood loss anemia:  due to presacral hematoma.  tranfused prbcs as per primary team.  monitor h/h    4. HTN. Labile. Hold antihypertensives. BP mildly improved.     5. DVT proph. PT eval.

## 2019-07-19 NOTE — PROGRESS NOTE ADULT - SUBJECTIVE AND OBJECTIVE BOX
PCP: Dr. Olmos  Reason for admission: fall, back pain    HPI: 88F, pmh of HTn, HLD, Osteoporosis, Osteoarthritis, left eye blindness, urinary incontinence, glaucoma who presented to the ER with back pain. She had fallen 7/11 after slipping from her chair while working,  seen in ed and was able to ambulate. Preliminary imaging was negative and she was discharged home. She was called later that upon further review she was noted to have a sacral fracture. The following day, her pain worsened and was unable to get out of bed on her own so she returned to Ed. She was admitted to trauma. Transfused prbcs for acute blood loss anemia from presacral hematoma.  Hospitalist service now consulted for medical comanagement. Hospital course notable for new onset paroxysmal atrial fibrillation.   Also with urinary retention requiring jeffers    7/17: pt seen and examined. Feeling better. Pain controlled when laying flat. Did not get out of bed today.   7/18- pt doing well. no CP/SOB   7/19- pt complaining of burping and nausea from PO meds. no episodes of emesis. alert and stable BP.    ROS: all 10 systems reviewed and is as above otherwise negative.     Vital Signs Last 24 Hrs  T(C): 36.5 (19 Jul 2019 11:38), Max: 36.9 (18 Jul 2019 20:28)  T(F): 97.7 (19 Jul 2019 11:38), Max: 98.5 (18 Jul 2019 20:28)  HR: 78 (19 Jul 2019 11:38) (77 - 78)  BP: 124/66 (19 Jul 2019 11:38) (116/60 - 134/77)  BP(mean): --  RR: 18 (19 Jul 2019 11:38) (17 - 18)  SpO2: 96% (19 Jul 2019 11:38) (96% - 98%)    GEN: thin elderly woman lying in bed, NAD  HEENT:   NC/AT. L eye blindness, R eye EOMI  CV:  +S1, +S2, RRR  RESP:   lungs clear to auscultation bilaterally, no wheeze, rales, rhonchi   BREAST:  not examined  GI:  abdomen soft, non-tender, non-distended, normoactive BS  RECTAL:  not examined  : jeffers in place   MSK:   normal muscle tone  EXT:  no edema, + OA in b/l hands, RUE ecchymosis  NEURO: no focal neurological deficits  SKIN:  no rashes                        9.3    7.36  )-----------( 289      ( 19 Jul 2019 07:58 )             31.2     07-19    136  |  104  |  14  ----------------------------<  69<L>  3.9   |  22  |  0.36<L>    Ca    7.3<L>      19 Jul 2019 07:58  Phos  3.5     07-19  Mg     1.9     07-19    CT Cervical Spine No Cont (07.11.19 @ 17:27) >  IMPRESSION:   No vertebral fracture is recognized.  Multilevel   degenerative changes of the cervical spine are present.    CT Pelvis Bony Only No Cont (07.11.19 @ 17:26) >  IMPRESSION:   Minimally displaced transversely oriented fracture of the sacrum at the   S2-3 level with adjacent muscular edema and hematoma.    This represents a change in interpretation from the preliminary report.   This was communicated electronically with the emergency room providers as   per departmental protocol. PCP: Dr. Olmos  Reason for admission: fall, back pain    HPI: 88F, pmh of HTn, HLD, Osteoporosis, Osteoarthritis, left eye blindness, urinary incontinence, glaucoma who presented to the ER with back pain. She had fallen 7/11 after slipping from her chair while working,  seen in ed and was able to ambulate. Preliminary imaging was negative and she was discharged home. She was called later that upon further review she was noted to have a sacral fracture. The following day, her pain worsened and was unable to get out of bed on her own so she returned to Ed. She was admitted to trauma. Transfused prbcs for acute blood loss anemia from presacral hematoma.  Hospitalist service now consulted for medical comanagement. Hospital course notable for new onset paroxysmal atrial fibrillation.   Also with urinary retention requiring jeffers    7/17: pt seen and examined. Feeling better. Pain controlled when laying flat. Did not get out of bed today.   7/18- pt doing well. no CP/SOB   7/19- pt complaining of burping and nausea from PO meds. no episodes of emesis. tele - NSR with sinus arrythmia     ROS: all 10 systems reviewed and is as above otherwise negative.     Vital Signs Last 24 Hrs  T(C): 36.5 (19 Jul 2019 11:38), Max: 36.9 (18 Jul 2019 20:28)  T(F): 97.7 (19 Jul 2019 11:38), Max: 98.5 (18 Jul 2019 20:28)  HR: 78 (19 Jul 2019 11:38) (77 - 78)  BP: 124/66 (19 Jul 2019 11:38) (116/60 - 134/77)  BP(mean): --  RR: 18 (19 Jul 2019 11:38) (17 - 18)  SpO2: 96% (19 Jul 2019 11:38) (96% - 98%)    GEN: thin elderly woman lying in bed, NAD  HEENT:   NC/AT. L eye blindness, R eye EOMI  CV:  +S1, +S2, irreg irreg  RESP:   lungs clear to auscultation bilaterally, no wheeze, rales, rhonchi   BREAST:  not examined  GI:  abdomen soft, non-tender, non-distended, normoactive BS  RECTAL:  not examined  : jeffers in place   MSK:   normal muscle tone  EXT:  no edema, + OA in b/l hands, RUE ecchymosis  NEURO: no focal neurological deficits  SKIN:  no rashes                        9.3    7.36  )-----------( 289      ( 19 Jul 2019 07:58 )             31.2     07-19    136  |  104  |  14  ----------------------------<  69<L>  3.9   |  22  |  0.36<L>    Ca    7.3<L>      19 Jul 2019 07:58  Phos  3.5     07-19  Mg     1.9     07-19    CT Cervical Spine No Cont (07.11.19 @ 17:27) >  IMPRESSION:   No vertebral fracture is recognized.  Multilevel   degenerative changes of the cervical spine are present.    CT Pelvis Bony Only No Cont (07.11.19 @ 17:26) >  IMPRESSION:   Minimally displaced transversely oriented fracture of the sacrum at the   S2-3 level with adjacent muscular edema and hematoma.    This represents a change in interpretation from the preliminary report.   This was communicated electronically with the emergency room providers as   per departmental protocol.

## 2019-07-20 RX ADMIN — ATORVASTATIN CALCIUM 10 MILLIGRAM(S): 80 TABLET, FILM COATED ORAL at 15:34

## 2019-07-20 RX ADMIN — Medication 800 MILLIGRAM(S): at 21:12

## 2019-07-20 RX ADMIN — TRAMADOL HYDROCHLORIDE 50 MILLIGRAM(S): 50 TABLET ORAL at 21:11

## 2019-07-20 RX ADMIN — Medication 650 MILLIGRAM(S): at 21:12

## 2019-07-20 RX ADMIN — Medication 800 MILLIGRAM(S): at 15:35

## 2019-07-20 RX ADMIN — AMIODARONE HYDROCHLORIDE 400 MILLIGRAM(S): 400 TABLET ORAL at 18:02

## 2019-07-20 RX ADMIN — Medication 650 MILLIGRAM(S): at 15:00

## 2019-07-20 RX ADMIN — Medication 1 TABLET(S): at 15:36

## 2019-07-20 RX ADMIN — TRAMADOL HYDROCHLORIDE 50 MILLIGRAM(S): 50 TABLET ORAL at 06:34

## 2019-07-20 RX ADMIN — PREGABALIN 1000 MICROGRAM(S): 225 CAPSULE ORAL at 15:36

## 2019-07-20 RX ADMIN — Medication 800 MILLIGRAM(S): at 17:59

## 2019-07-20 RX ADMIN — ENOXAPARIN SODIUM 30 MILLIGRAM(S): 100 INJECTION SUBCUTANEOUS at 18:01

## 2019-07-20 RX ADMIN — POLYETHYLENE GLYCOL 3350 17 GRAM(S): 17 POWDER, FOR SOLUTION ORAL at 15:34

## 2019-07-20 RX ADMIN — Medication 650 MILLIGRAM(S): at 17:59

## 2019-07-20 RX ADMIN — TRAMADOL HYDROCHLORIDE 50 MILLIGRAM(S): 50 TABLET ORAL at 21:51

## 2019-07-20 RX ADMIN — AMIODARONE HYDROCHLORIDE 400 MILLIGRAM(S): 400 TABLET ORAL at 06:35

## 2019-07-20 RX ADMIN — LATANOPROST 1 DROP(S): 0.05 SOLUTION/ DROPS OPHTHALMIC; TOPICAL at 21:12

## 2019-07-20 RX ADMIN — Medication 650 MILLIGRAM(S): at 15:33

## 2019-07-20 RX ADMIN — ENOXAPARIN SODIUM 30 MILLIGRAM(S): 100 INJECTION SUBCUTANEOUS at 06:35

## 2019-07-20 NOTE — PROGRESS NOTE ADULT - ASSESSMENT
ASSESSMENT AND PLAN:  88f, PMH as above a/w:    # Mechanical fall, sacral fracture/hematoma, inability to ambulate:  - plan as per trauma surgery  - continue morphine/tramadol for pain  - lidoderm patch  -seen by ortho, wbat  -physical therapy   -monitor h/h  -incentive spirometry  -bowel regimen    # Acute blood loss anemia:  - due to presacral hematoma.  - transfused prbcs  - monitor h/h    # Urinary retention/constipation/dehydration:-  -s/p IVF  -encourage po fluids.   -added miralax/senna to colace  -jeffers placed   -should improve with mobilization  -would dc with jeffers and have TOV @ Rehab once more mobile in 2-3 days post discharge    # H/o HTN:  -continue to hold arb for now.    # PAF: now in NSR  - cardio input noted.  will stop dig and start amio load.  monitor on tele.    -echo with mild mr/trace AI, moderate TR, preserved LVEF, mildly dilated LA  -hold off on a/c for now given hematoma, fall risk  -7/16 with rapid a fib, sustained, improved with iv and po cardizem, but unable to get cardizem regularly d/t blood pressure  -dc cardizem. dc dig 0.125 mg. start Amio 400mg BID x 5 days,   then 200mg daily as per cardio. Check LFTs, PFTs, TFTs.    # HLD:  -statin    # DVT px.     # Dispo:   if stable, dc to rehab with jeffers (patient prefers [facility]) ASSESSMENT AND PLAN:  88f, PMH as above a/w:    # Mechanical fall, sacral fracture/hematoma, inability to ambulate:  - plan as per trauma surgery  - continue morphine/tramadol for pain  - lidoderm patch  -seen by ortho, wbat  -physical therapy   -monitor h/h  -incentive spirometry  -bowel regimen    # Acute blood loss anemia:  - due to presacral hematoma.  - transfused prbcs  - monitor h/h    # Urinary retention/constipation/dehydration:-  -s/p IVF  -encourage po fluids.   -added miralax/senna to colace  -jeffers placed   -should improve with mobilization  -would dc with jeffers and have TOV @ Rehab once more mobile in 2-3 days post discharge    # H/o HTN:  -continue to hold arb for now.    # PAF: now in NSR  - cardio input noted.  will stop dig and start amio load.  monitor on tele.    -echo with mild mr/trace AI, moderate TR, preserved LVEF, mildly dilated LA  -hold off on a/c for now given hematoma, fall risk  -7/16 with rapid a fib, sustained, improved with iv and po cardizem, but unable to get cardizem regularly d/t blood pressure  -dc cardizem. dc dig 0.125 mg. start Amio 400mg BID x 5 days,   then 200mg daily as per cardio. Check LFTs, PFTs, TFTs.    # HLD:  -statin    # DVT px.     # Dispo:   if stable, dc to rehab with jeffers (patient prefers [facility]) on monday

## 2019-07-20 NOTE — PROGRESS NOTE ADULT - SUBJECTIVE AND OBJECTIVE BOX
PCP: Dr. Olmos  Reason for admission: fall, back pain    HPI: 88F, pmh of HTn, HLD, Osteoporosis, Osteoarthritis, left eye blindness, urinary incontinence, glaucoma who presented to the ER with back pain. She had fallen 7/11 after slipping from her chair while working,  seen in ed and was able to ambulate. Preliminary imaging was negative and she was discharged home. She was called later that upon further review she was noted to have a sacral fracture. The following day, her pain worsened and was unable to get out of bed on her own so she returned to Ed. She was admitted to trauma. Transfused prbcs for acute blood loss anemia from presacral hematoma.  Hospitalist service now consulted for medical comanagement. Hospital course notable for new onset paroxysmal atrial fibrillation.   Also with urinary retention requiring jeffers    7/17: pt seen and examined. Feeling better. Pain controlled when laying flat. Did not get out of bed today.   7/18- pt doing well. no CP/SOB   7/19- pt complaining of burping and nausea from PO meds. no episodes of emesis. tele - NSR with sinus arrythmia   7/20- pt alert. states she's been having some posterior RLE pain but resolving with pain meds. nausea improved today. no CP or SOB.     ROS: all 10 systems reviewed and is as above otherwise negative.     Vital Signs Last 24 Hrs  T(C): 36.3 (20 Jul 2019 06:11), Max: 36.7 (19 Jul 2019 19:57)  T(F): 97.4 (20 Jul 2019 06:11), Max: 98 (19 Jul 2019 19:57)  HR: 56 (20 Jul 2019 06:11) (56 - 85)  BP: 100/59 (20 Jul 2019 06:11) (100/59 - 134/70)  BP(mean): --  RR: 18 (20 Jul 2019 06:11) (18 - 18)  SpO2: 97% (20 Jul 2019 06:11) (94% - 97%)    GEN: thin elderly woman lying in bed, NAD  HEENT:   NC/AT. L eye blindness, R eye EOMI  CV:  +S1, +S2, irreg irreg  RESP:   lungs clear to auscultation bilaterally, no wheeze, rales, rhonchi   BREAST:  not examined  GI:  abdomen soft, non-tender, non-distended, normoactive BS  RECTAL:  not examined  : jeffers in place   MSK:   normal muscle tone  EXT:  no edema, + OA in b/l hands  NEURO: no focal neurological deficits  SKIN:  no rashes                        9.3    7.36  )-----------( 289      ( 19 Jul 2019 07:58 )             31.2     07-19    136  |  104  |  14  ----------------------------<  69<L>  3.9   |  22  |  0.36<L>    Ca    7.3<L>      19 Jul 2019 07:58  Phos  3.5     07-19  Mg     1.9     07-19    CT Cervical Spine No Cont (07.11.19 @ 17:27) >  IMPRESSION:   No vertebral fracture is recognized.  Multilevel   degenerative changes of the cervical spine are present.    CT Pelvis Bony Only No Cont (07.11.19 @ 17:26) >  IMPRESSION:   Minimally displaced transversely oriented fracture of the sacrum at the   S2-3 level with adjacent muscular edema and hematoma.    This represents a change in interpretation from the preliminary report.   This was communicated electronically with the emergency room providers as   per departmental protocol. PCP: Dr. Olmos  Reason for admission: fall, back pain    HPI: 88F, pmh of HTn, HLD, Osteoporosis, Osteoarthritis, left eye blindness, urinary incontinence, glaucoma who presented to the ER with back pain. She had fallen 7/11 after slipping from her chair while working,  seen in ed and was able to ambulate. Preliminary imaging was negative and she was discharged home. She was called later that upon further review she was noted to have a sacral fracture. The following day, her pain worsened and was unable to get out of bed on her own so she returned to Ed. She was admitted to trauma. Transfused prbcs for acute blood loss anemia from presacral hematoma.  Hospitalist service now consulted for medical comanagement. Hospital course notable for new onset paroxysmal atrial fibrillation.   Also with urinary retention requiring jeffers    7/17: pt seen and examined. Feeling better. Pain controlled when laying flat. Did not get out of bed today.   7/18- pt doing well. no CP/SOB   7/19- pt complaining of burping and nausea from PO meds. no episodes of emesis. tele - NSR with sinus arrythmia   7/20- pt alert. states she's been having some posterior RLE pain but resolving with pain meds. nausea improved today. no CP or SOB. son updated at bedside     ROS: all 10 systems reviewed and is as above otherwise negative.     Vital Signs Last 24 Hrs  T(C): 36.3 (20 Jul 2019 06:11), Max: 36.7 (19 Jul 2019 19:57)  T(F): 97.4 (20 Jul 2019 06:11), Max: 98 (19 Jul 2019 19:57)  HR: 56 (20 Jul 2019 06:11) (56 - 85)  BP: 100/59 (20 Jul 2019 06:11) (100/59 - 134/70)  BP(mean): --  RR: 18 (20 Jul 2019 06:11) (18 - 18)  SpO2: 97% (20 Jul 2019 06:11) (94% - 97%)    GEN: thin elderly woman lying in bed, NAD  HEENT:   NC/AT. L eye blindness, R eye EOMI  CV:  +S1, +S2, irreg irreg  RESP:   lungs clear to auscultation bilaterally, no wheeze, rales, rhonchi   BREAST:  not examined  GI:  abdomen soft, non-tender, non-distended, normoactive BS  RECTAL:  not examined  : jeffers in place   MSK:   normal muscle tone  EXT:  no edema, + OA in b/l hands  NEURO: no focal neurological deficits  SKIN:  no rashes                        9.3    7.36  )-----------( 289      ( 19 Jul 2019 07:58 )             31.2     07-19    136  |  104  |  14  ----------------------------<  69<L>  3.9   |  22  |  0.36<L>    Ca    7.3<L>      19 Jul 2019 07:58  Phos  3.5     07-19  Mg     1.9     07-19    CT Cervical Spine No Cont (07.11.19 @ 17:27) >  IMPRESSION:   No vertebral fracture is recognized.  Multilevel   degenerative changes of the cervical spine are present.    CT Pelvis Bony Only No Cont (07.11.19 @ 17:26) >  IMPRESSION:   Minimally displaced transversely oriented fracture of the sacrum at the   S2-3 level with adjacent muscular edema and hematoma.    This represents a change in interpretation from the preliminary report.   This was communicated electronically with the emergency room providers as   per departmental protocol.

## 2019-07-21 RX ADMIN — Medication 1 TABLET(S): at 11:33

## 2019-07-21 RX ADMIN — POLYETHYLENE GLYCOL 3350 17 GRAM(S): 17 POWDER, FOR SOLUTION ORAL at 11:33

## 2019-07-21 RX ADMIN — LIDOCAINE 1 PATCH: 4 CREAM TOPICAL at 19:45

## 2019-07-21 RX ADMIN — TRAMADOL HYDROCHLORIDE 50 MILLIGRAM(S): 50 TABLET ORAL at 21:32

## 2019-07-21 RX ADMIN — TRAMADOL HYDROCHLORIDE 50 MILLIGRAM(S): 50 TABLET ORAL at 06:07

## 2019-07-21 RX ADMIN — PREGABALIN 1000 MICROGRAM(S): 225 CAPSULE ORAL at 11:33

## 2019-07-21 RX ADMIN — AMIODARONE HYDROCHLORIDE 400 MILLIGRAM(S): 400 TABLET ORAL at 06:07

## 2019-07-21 RX ADMIN — Medication 650 MILLIGRAM(S): at 06:07

## 2019-07-21 RX ADMIN — MORPHINE SULFATE 4 MILLIGRAM(S): 50 CAPSULE, EXTENDED RELEASE ORAL at 12:15

## 2019-07-21 RX ADMIN — ENOXAPARIN SODIUM 30 MILLIGRAM(S): 100 INJECTION SUBCUTANEOUS at 17:51

## 2019-07-21 RX ADMIN — ENOXAPARIN SODIUM 30 MILLIGRAM(S): 100 INJECTION SUBCUTANEOUS at 06:07

## 2019-07-21 RX ADMIN — Medication 650 MILLIGRAM(S): at 21:33

## 2019-07-21 RX ADMIN — MORPHINE SULFATE 4 MILLIGRAM(S): 50 CAPSULE, EXTENDED RELEASE ORAL at 11:32

## 2019-07-21 RX ADMIN — TRAMADOL HYDROCHLORIDE 50 MILLIGRAM(S): 50 TABLET ORAL at 06:47

## 2019-07-21 RX ADMIN — LATANOPROST 1 DROP(S): 0.05 SOLUTION/ DROPS OPHTHALMIC; TOPICAL at 21:32

## 2019-07-21 RX ADMIN — TRAMADOL HYDROCHLORIDE 50 MILLIGRAM(S): 50 TABLET ORAL at 22:12

## 2019-07-21 RX ADMIN — Medication 800 MILLIGRAM(S): at 06:07

## 2019-07-21 RX ADMIN — Medication 800 MILLIGRAM(S): at 14:38

## 2019-07-21 RX ADMIN — ATORVASTATIN CALCIUM 10 MILLIGRAM(S): 80 TABLET, FILM COATED ORAL at 11:33

## 2019-07-21 RX ADMIN — Medication 800 MILLIGRAM(S): at 21:32

## 2019-07-21 RX ADMIN — SODIUM CHLORIDE 75 MILLILITER(S): 9 INJECTION INTRAMUSCULAR; INTRAVENOUS; SUBCUTANEOUS at 06:06

## 2019-07-21 RX ADMIN — Medication 800 MILLIGRAM(S): at 15:08

## 2019-07-21 RX ADMIN — LIDOCAINE 1 PATCH: 4 CREAM TOPICAL at 11:33

## 2019-07-21 RX ADMIN — AMIODARONE HYDROCHLORIDE 400 MILLIGRAM(S): 400 TABLET ORAL at 17:50

## 2019-07-21 NOTE — PROGRESS NOTE ADULT - ASSESSMENT
ASSESSMENT AND PLAN:  88f, PMH as above a/w:  Mechanical fall, sacral fracture/hematoma, inability to ambulate. Mgmt as per ortho, primary team.   Pain control, PT.   Atrial fibrillation. Presently in SR with ectopy. Will d/c Dig and start Amio 400mg BID x 5 days,   then 200mg daily. Check LFTs, PFTs, TFTs.  Hold on AV anson agents for now.   No LTA for now in setting of SR, recent fall.   Can have outpt monitoring.   2Decho- Nl LV fxn, mild MR, mild to mod TR.

## 2019-07-21 NOTE — DIETITIAN INITIAL EVALUATION ADULT. - OTHER INFO
87yo female admitted s/p fall with closed fracture of sacrum, and anemia. +urinary retention w/ jeffers catheter. Pt reports poor intake due to persistent lack of appetite and episodes of nausea. Pt willing to trial protein enriched Jello Gelatein and ensure enlive shakes to meet protein/energy needs. Discussed and made suggestions on food choices high in protein/calories to assist with healing. Pt denies any difficulty chewing or swallowing. Only consuming since admission broth/rice and pudding. Intake <50% of ENN x 10 days. Pt stated has lost weight but unaware of how much and how long. Skin remains intact with no Pressure ulcers at this time, due to poor nutrition and sedentary lifestyle pt high risk for skin breakdown. Suggest MVI w/ minerals daily to meet RDI's. Will follow up and continue to monitor status.

## 2019-07-21 NOTE — CHART NOTE - NSCHARTNOTEFT_GEN_A_CORE
Upon Nutritional Assessment by the Registered Dietitian your patient was determined to meet criteria / has evidence of the following diagnosis/diagnoses:          [ ]  Mild Protein Calorie Malnutrition        [ ]  Moderate Protein Calorie Malnutrition        [x ] Severe Protein Calorie Malnutrition        [ ] Unspecified Protein Calorie Malnutrition        [x ] Underweight / BMI <19        [ ] Morbid Obesity / BMI > 40      Findings as based on:  •  Comprehensive nutrition assessment and consultation  •  Calorie counts (nutrient intake analysis)  •  Food acceptance and intake status from observations by staff  •  Follow up  •  Patient education  •  Intervention secondary to interdisciplinary rounds  •   concerns  ****** Pt meets criteria for severe protein/calorie malnutrition for acute illness secondary to poor po intake and severe muscle/fat wasting.     Treatment:        1) continue with regular liberalized diet.   2) Gelatein plus Jello po BID, Ensure enlive 8oz po BID   3) monitor lytes/labs replete as needed.   4) assistance with meals as needed.   5) MVI with minerals to meet RDI's    PROVIDER Section:     By signing this assessment you are acknowledging and agree with the diagnosis/diagnoses assigned by the Registered Dietitian    Comments:

## 2019-07-21 NOTE — PROGRESS NOTE ADULT - SUBJECTIVE AND OBJECTIVE BOX
PCP: Dr. Olmos  Reason for admission: fall, back pain    HPI: 88F, pmh of HTn, HLD, Osteoporosis, Osteoarthritis, left eye blindness, urinary incontinence, glaucoma who presented to the ER with back pain. She had fallen 7/11 after slipping from her chair while working,  seen in ed and was able to ambulate. Preliminary imaging was negative and she was discharged home. She was called later that upon further review she was noted to have a sacral fracture. The following day, her pain worsened and was unable to get out of bed on her own so she returned to Ed. She was admitted to trauma. Transfused prbcs for acute blood loss anemia from presacral hematoma.  Hospitalist service now consulted for medical comanagement. Hospital course notable for new onset paroxysmal atrial fibrillation.   Also with urinary retention requiring jeffers    7/17: pt seen and examined. Feeling better. Pain controlled when laying flat. Did not get out of bed today.   7/18- pt doing well. no CP/SOB   7/19- pt complaining of burping and nausea from PO meds. no episodes of emesis. tele - NSR with sinus arrythmia   7/20- pt alert. states she's been having some posterior RLE pain but resolving with pain meds. nausea improved today. no CP or SOB. son updated at bedside   7/21- pt doing well. no acute complaints.     ROS: all 10 systems reviewed and is as above otherwise negative.     Vital Signs Last 24 Hrs  T(C): 36.2 (21 Jul 2019 12:33), Max: 36.7 (20 Jul 2019 20:17)  T(F): 97.2 (21 Jul 2019 12:33), Max: 98.1 (21 Jul 2019 05:54)  HR: 63 (21 Jul 2019 12:33) (60 - 63)  BP: 116/49 (21 Jul 2019 12:33) (107/50 - 121/54)  BP(mean): --  RR: 18 (21 Jul 2019 12:33) (18 - 18)  SpO2: 97% (21 Jul 2019 12:33) (97% - 99%)    GEN: thin elderly woman lying in bed, NAD  HEENT:   NC/AT. L eye blindness, R eye EOMI  CV:  +S1, +S2, irreg irreg  RESP:   lungs clear to auscultation bilaterally, no wheeze, rales, rhonchi   BREAST:  not examined  GI:  abdomen soft, non-tender, non-distended, normoactive BS  RECTAL:  not examined  : jeffers in place   MSK:   normal muscle tone  EXT:  no edema, + OA in b/l hands  NEURO: no focal neurological deficits  SKIN:  no rashes             Labs: no new labwork since 7/19    CT Cervical Spine No Cont (07.11.19 @ 17:27) >  IMPRESSION:   No vertebral fracture is recognized.  Multilevel   degenerative changes of the cervical spine are present.    CT Pelvis Bony Only No Cont (07.11.19 @ 17:26) >  IMPRESSION:   Minimally displaced transversely oriented fracture of the sacrum at the   S2-3 level with adjacent muscular edema and hematoma.    This represents a change in interpretation from the preliminary report.   This was communicated electronically with the emergency room providers as   per departmental protocol.

## 2019-07-21 NOTE — DIETITIAN INITIAL EVALUATION ADULT. - ADD RECOMMEND
1) continue with regular liberalized diet. 2) Gelatein plus Jello po BID, Ensure enlive 8oz po BID 3) monitor lytes/labs replete as needed. 4) assistance with meals as needed. 5) MVI with minerals to meet RDI's

## 2019-07-21 NOTE — DIETITIAN INITIAL EVALUATION ADULT. - PERTINENT MEDS FT
MEDICATIONS  (STANDING):  acetaminophen   Tablet .. 650 milliGRAM(s) Oral every 8 hours  amiodarone    Tablet 400 milliGRAM(s) Oral two times a day  atorvastatin 10 milliGRAM(s) Oral daily  calcium carbonate 1250 mG  + Vitamin D (OsCal 500 + D) 1 Tablet(s) Oral daily  cyanocobalamin 1000 MICROGram(s) Oral daily  docusate sodium 100 milliGRAM(s) Oral two times a day  enoxaparin Injectable 30 milliGRAM(s) SubCutaneous every 12 hours  ibuprofen  Tablet. 800 milliGRAM(s) Oral three times a day  latanoprost 0.005% Ophthalmic Solution 1 Drop(s) Right EYE at bedtime  lidocaine   Patch 1 Patch Transdermal daily  polyethylene glycol 3350 17 Gram(s) Oral daily  senna 2 Tablet(s) Oral at bedtime  sodium chloride 0.9%. 1000 milliLiter(s) (75 mL/Hr) IV Continuous <Continuous>    MEDICATIONS  (PRN):  bisacodyl 5 milliGRAM(s) Oral daily PRN Constipation  metoprolol tartrate Injectable 2.5 milliGRAM(s) IV Push every 6 hours PRN for HR greater than 130  morphine  - Injectable 4 milliGRAM(s) SubCutaneous every 4 hours PRN Severe Pain (7 - 10)  ondansetron Injectable 4 milliGRAM(s) IV Push every 6 hours PRN Nausea and/or Vomiting  traMADol 50 milliGRAM(s) Oral every 6 hours PRN Moderate Pain (4 - 6)

## 2019-07-21 NOTE — DIETITIAN INITIAL EVALUATION ADULT. - PERTINENT LABORATORY DATA
Phosphorus Level, Serum: 3.5 mg/dL (07.19.19 @ 07:58)Magnesium, Serum in AM (07.19.19 @ 07:58)    Magnesium, Serum: 1.9 mg/dL

## 2019-07-21 NOTE — DIETITIAN INITIAL EVALUATION ADULT. - ENERGY NEEDS
Ht.   66   "        Wt.   99.9 #              BMI 16                 IBW  135  #               Pt is at  73  %  IBW

## 2019-07-21 NOTE — DIETITIAN INITIAL EVALUATION ADULT. - PHYSICAL APPEARANCE
underweight/emaciated/other (specify) NFPE indicates severe muscle wasting: temporal, clavicle, deltoid, scapula, calves, patellas, quads, interosseous. Severe fat wasting: ocular, triceps, ribs, buccal. Skin intact with no edema documented. Fer score= 12 (high risk for skin breakdown).

## 2019-07-21 NOTE — DIETITIAN INITIAL EVALUATION ADULT. - MALNUTRITION
severe malnutrition Pt meets criteria for severe protein/calorie malnutrition for acute illness secondary to poor po intake and severe muscle/fat wasting.

## 2019-07-21 NOTE — PROGRESS NOTE ADULT - ASSESSMENT
ASSESSMENT AND PLAN:  88f, PMH as above a/w:    # Mechanical fall, sacral fracture/hematoma, inability to ambulate:  - plan as per trauma surgery  - continue morphine/tramadol for pain  - lidoderm patch  -seen by ortho, wbat  -physical therapy   -monitor h/h  -incentive spirometry  -bowel regimen    # Acute blood loss anemia:  - due to presacral hematoma.  - transfused prbcs  - monitor h/h    # Urinary retention/constipation/dehydration:-  -s/p IVF  -encourage po fluids.   -added miralax/senna to colace  -jeffers placed   -should improve with mobilization  -would dc with jeffers and have TOV @ Rehab once more mobile in 2-3 days post discharge    # H/o HTN:  -continue to hold arb for now.    # PAF: now in NSR  - cardio input noted.  will stop dig and start amio load.  monitor on tele.    -echo with mild mr/trace AI, moderate TR, preserved LVEF, mildly dilated LA  -hold off on a/c for now given hematoma, fall risk  -7/16 with rapid a fib, sustained, improved with iv and po cardizem, but unable to get cardizem regularly d/t blood pressure  -dc cardizem. dc dig 0.125 mg. start Amio 400mg BID x 5 days,   then 200mg daily as per cardio. Check LFTs, PFTs, TFTs.    # HLD:  -statin    # DVT px.     # Dispo:   if stable, dc to rehab with jeffers (patient prefers [facility]) on monday

## 2019-07-21 NOTE — PROGRESS NOTE ADULT - SUBJECTIVE AND OBJECTIVE BOX
Cardiology Progress Note    HPI: 88F, pmh of HTn, HLD, Osteoporosis, Osteoarthritis, left eye blindness, urinary incontinence, glaucoma who presented to the ER with back pain. She had fallen 7/11 after slipping from her chair while working,  seen in ed and was able to ambulate. Preliminary imaging was negative and she was discharged home. She was called later that upon further review she was noted to have a sacral fracture. The following day, her pain worsened and was unable to get out of bed on her own so she returned to Ed. She was admitted to trauma. Transfused prbcs for acute blood loss anemia from presacral hematoma.  Hospitalist service now consulted for medical comanagement.    7/16. No events last pm. No CP/SOB. Lying flat in bed. Pt with diffuse body pain.  Tele- SR with APCs.     7/17. No events last pm. Lying in bed, not moving much. No CP. Feels weak/tired.     7/19. No CP/SOB. Not moving much. Lying flat in bed.  Currently in SR, APCs/PVCs.    7/21 Doing ok, lying on bed, no complaints    ROS: all 10 systems reviewed and is as above otherwise negative.     Vital Signs Last 24 Hrs  T(C): 36.5 (15 Jul 2019 09:36), Max: 36.6 (14 Jul 2019 16:26)  T(F): 97.7 (15 Jul 2019 09:36), Max: 97.9 (14 Jul 2019 20:59)  HR: 81 (15 Jul 2019 14:00) (66 - 113)  BP: 101/56 (15 Jul 2019 14:00) (100/60 - 130/59)  BP(mean): 68 (15 Jul 2019 14:00) (65 - 81)  RR: 11 (15 Jul 2019 14:00) (11 - 27)  SpO2: 94% (15 Jul 2019 14:00) (92% - 98%)    PHYSICAL EXAM:    GENERAL: Elderly female, thin appearing,  Comfortable, no acute distress   HEAD:  Normocephalic, atraumatic  EYES: Left eye blindness. Right eye-eomi  HEENT: Moist mucous membranes  NECK: Supple, No JVD  NERVOUS SYSTEM:  Alert & Oriented X3, non focal  CHEST/LUNG: Clear to auscultation bilaterally  HEART: Regular rate and rhythm  ABDOMEN: Soft, Nontender, Nondistended, Bowel sounds present  GENITOURINARY: Voiding, no palpable bladder  EXTREMITIES:   No clubbing, cyanosis, or edema  MUSCULOSKELETAL- . +OA b/l  hands  SKIN-no rash    LABS:                        9.1    8.92  )-----------( 223      ( 15 Jul 2019 06:33 )             29.7     07-14    134<L>  |  104  |  16  ----------------------------<  82  4.0   |  24  |  0.50    Ca    7.5<L>      14 Jul 2019 06:32  Phos  3.9     07-14  Mg     2.2     07-14        CT Cervical Spine No Cont (07.11.19 @ 17:27) >  IMPRESSION:   No vertebral fracture is recognized.  Multilevel   degenerative changes of the cervical spine are present.    CT Pelvis Bony Only No Cont (07.11.19 @ 17:26) >  IMPRESSION:   Minimally displaced transversely oriented fracture of the sacrum at the   S2-3 level with adjacent muscular edema and hematoma.    This represents a change in interpretation from the preliminary report.   This was communicated electronically with the emergency room providers as   per departmental protocol.    MEDICATIONS  (STANDING):  acetaminophen   Tablet .. 650 milliGRAM(s) Oral every 8 hours  atorvastatin 10 milliGRAM(s) Oral daily  calcium carbonate 1250 mG  + Vitamin D (OsCal 500 + D) 1 Tablet(s) Oral daily  cyanocobalamin 1000 MICROGram(s) Oral daily  docusate sodium 100 milliGRAM(s) Oral two times a day  enoxaparin Injectable 30 milliGRAM(s) SubCutaneous every 12 hours  ibuprofen  Tablet. 800 milliGRAM(s) Oral three times a day  latanoprost 0.005% Ophthalmic Solution 1 Drop(s) Right EYE at bedtime  lidocaine   Patch 1 Patch Transdermal daily    MEDICATIONS  (PRN):  bisacodyl 5 milliGRAM(s) Oral daily PRN Constipation  morphine  - Injectable 4 milliGRAM(s) SubCutaneous every 4 hours PRN Severe Pain (7 - 10)  ondansetron Injectable 4 milliGRAM(s) IV Push every 6 hours PRN Nausea and/or Vomiting  traMADol 50 milliGRAM(s) Oral every 6 hours PRN Moderate Pain (4 - 6)    < from: Transthoracic Echocardiogram (07.15.19 @ 11:27) >  Left ventricle systolic function appears preserved based on difficult   transthoracic views; segmental wall motion abnormalities can not be rule   out.   The left ventricle is normal in size.   Visual estimation of left ventricle ejection fraction is 55-60%.   The left atrium is mildly dilated.   Normal appearing right ventricle structure and function.     The aortic valve is well visualized, appears mildly sclerotic. Valve   opening seems to be normal.   Trace aortic regurgitation is present.   Fibrocalcific changes noted to the mitral valve leaflets with preserved   leaflet excursion.   Posterior mitral annular calcification noted.   Trace to mild mitral regurgitation is present.   The tricuspid valve leaflets appear mildly thickened and/or calcified,   but   open well.   Mild to moderate Tricuspid regurgitation is present. Mild pulmonary   hypertension.     No evidence of pericardial effusion.      < end of copied text >

## 2019-07-22 VITALS
DIASTOLIC BLOOD PRESSURE: 76 MMHG | SYSTOLIC BLOOD PRESSURE: 110 MMHG | HEART RATE: 57 BPM | TEMPERATURE: 98 F | RESPIRATION RATE: 17 BRPM | OXYGEN SATURATION: 99 %

## 2019-07-22 RX ORDER — AMIODARONE HYDROCHLORIDE 400 MG/1
2 TABLET ORAL
Qty: 0 | Refills: 0 | DISCHARGE
Start: 2019-07-22

## 2019-07-22 RX ADMIN — AMIODARONE HYDROCHLORIDE 400 MILLIGRAM(S): 400 TABLET ORAL at 06:17

## 2019-07-22 RX ADMIN — Medication 800 MILLIGRAM(S): at 15:15

## 2019-07-22 RX ADMIN — Medication 800 MILLIGRAM(S): at 14:34

## 2019-07-22 RX ADMIN — Medication 1 TABLET(S): at 11:20

## 2019-07-22 RX ADMIN — PREGABALIN 1000 MICROGRAM(S): 225 CAPSULE ORAL at 11:20

## 2019-07-22 RX ADMIN — ATORVASTATIN CALCIUM 10 MILLIGRAM(S): 80 TABLET, FILM COATED ORAL at 11:20

## 2019-07-22 RX ADMIN — LIDOCAINE 1 PATCH: 4 CREAM TOPICAL at 11:21

## 2019-07-22 RX ADMIN — Medication 800 MILLIGRAM(S): at 06:17

## 2019-07-22 RX ADMIN — LIDOCAINE 1 PATCH: 4 CREAM TOPICAL at 06:15

## 2019-07-22 RX ADMIN — Medication 650 MILLIGRAM(S): at 06:16

## 2019-07-22 RX ADMIN — ENOXAPARIN SODIUM 30 MILLIGRAM(S): 100 INJECTION SUBCUTANEOUS at 06:17

## 2019-07-22 NOTE — PROGRESS NOTE ADULT - ASSESSMENT
ASSESSMENT AND PLAN:  88f, PMH as above a/w:    1. Mechanical fall, sacral fracture/hematoma, inability to ambulate. Mgmt as per ortho, primary team.   Pain control, PT.     2. Atrial fibrillation. Presently in SR with ectopy. Will continue Amio 400mg BID for 2 more days,   then 200mg daily. Check LFTs, PFTs, TFTs- will need to be followed as outpt as well.  Hold on AV anson agents for now.   No LTA for now in setting of SR, recent fall.   Can have outpt monitoring.   2Decho- Nl LV fxn, mild MR, mild to mod TR.    3. Acute blood loss anemia:  due to presacral hematoma.  tranfused prbcs as per primary team.  monitor h/h    4. HTN. Labile. Hold antihypertensives. BP improved.     5. DVT proph. PT eval.

## 2019-07-22 NOTE — PROGRESS NOTE ADULT - SUBJECTIVE AND OBJECTIVE BOX
Cardiology Progress Note    HPI: 88F, pmh of HTn, HLD, Osteoporosis, Osteoarthritis, left eye blindness, urinary incontinence, glaucoma who presented to the ER with back pain. She had fallen 7/11 after slipping from her chair while working,  seen in ed and was able to ambulate. Preliminary imaging was negative and she was discharged home. She was called later that upon further review she was noted to have a sacral fracture. The following day, her pain worsened and was unable to get out of bed on her own so she returned to Ed. She was admitted to trauma. Transfused prbcs for acute blood loss anemia from presacral hematoma.  Hospitalist service now consulted for medical comanagement.    7/16. No events last pm. No CP/SOB. Lying flat in bed. Pt with diffuse body pain.  Tele- SR with APCs.     7/17. No events last pm. Lying in bed, not moving much. No CP. Feels weak/tired.     7/19. No CP/SOB. Not moving much. Lying flat in bed.  Currently in SR, APCs/PVCs.    7/22. No events last pm. Remains weak/tired.  No CP/SOB. SR on tele.     ROS: all 10 systems reviewed and is as above otherwise negative.     Vital Signs Last 24 Hrs  T(C): 36.5 (15 Jul 2019 09:36), Max: 36.6 (14 Jul 2019 16:26)  T(F): 97.7 (15 Jul 2019 09:36), Max: 97.9 (14 Jul 2019 20:59)  HR: 81 (15 Jul 2019 14:00) (66 - 113)  BP: 101/56 (15 Jul 2019 14:00) (100/60 - 130/59)  BP(mean): 68 (15 Jul 2019 14:00) (65 - 81)  RR: 11 (15 Jul 2019 14:00) (11 - 27)  SpO2: 94% (15 Jul 2019 14:00) (92% - 98%)    PHYSICAL EXAM:    GENERAL: Elderly female, thin appearing,  Comfortable, no acute distress   HEAD:  Normocephalic, atraumatic  EYES: Left eye blindness. Right eye-eomi  HEENT: Moist mucous membranes  NECK: Supple, No JVD  NERVOUS SYSTEM:  Alert & Oriented X3, non focal  CHEST/LUNG: Clear to auscultation bilaterally  HEART: Regular rate and rhythm  ABDOMEN: Soft, Nontender, Nondistended, Bowel sounds present  GENITOURINARY: Voiding, no palpable bladder  EXTREMITIES:   No clubbing, cyanosis, or edema  MUSCULOSKELETAL- . +OA b/l  hands  SKIN-no rash    LABS:                        9.1    8.92  )-----------( 223      ( 15 Jul 2019 06:33 )             29.7     07-14    134<L>  |  104  |  16  ----------------------------<  82  4.0   |  24  |  0.50    Ca    7.5<L>      14 Jul 2019 06:32  Phos  3.9     07-14  Mg     2.2     07-14        CT Cervical Spine No Cont (07.11.19 @ 17:27) >  IMPRESSION:   No vertebral fracture is recognized.  Multilevel   degenerative changes of the cervical spine are present.    CT Pelvis Bony Only No Cont (07.11.19 @ 17:26) >  IMPRESSION:   Minimally displaced transversely oriented fracture of the sacrum at the   S2-3 level with adjacent muscular edema and hematoma.    This represents a change in interpretation from the preliminary report.   This was communicated electronically with the emergency room providers as   per departmental protocol.    MEDICATIONS  (STANDING):  acetaminophen   Tablet .. 650 milliGRAM(s) Oral every 8 hours  atorvastatin 10 milliGRAM(s) Oral daily  calcium carbonate 1250 mG  + Vitamin D (OsCal 500 + D) 1 Tablet(s) Oral daily  cyanocobalamin 1000 MICROGram(s) Oral daily  docusate sodium 100 milliGRAM(s) Oral two times a day  enoxaparin Injectable 30 milliGRAM(s) SubCutaneous every 12 hours  ibuprofen  Tablet. 800 milliGRAM(s) Oral three times a day  latanoprost 0.005% Ophthalmic Solution 1 Drop(s) Right EYE at bedtime  lidocaine   Patch 1 Patch Transdermal daily    MEDICATIONS  (PRN):  bisacodyl 5 milliGRAM(s) Oral daily PRN Constipation  morphine  - Injectable 4 milliGRAM(s) SubCutaneous every 4 hours PRN Severe Pain (7 - 10)  ondansetron Injectable 4 milliGRAM(s) IV Push every 6 hours PRN Nausea and/or Vomiting  traMADol 50 milliGRAM(s) Oral every 6 hours PRN Moderate Pain (4 - 6)    < from: Transthoracic Echocardiogram (07.15.19 @ 11:27) >  Left ventricle systolic function appears preserved based on difficult   transthoracic views; segmental wall motion abnormalities can not be rule   out.   The left ventricle is normal in size.   Visual estimation of left ventricle ejection fraction is 55-60%.   The left atrium is mildly dilated.   Normal appearing right ventricle structure and function.     The aortic valve is well visualized, appears mildly sclerotic. Valve   opening seems to be normal.   Trace aortic regurgitation is present.   Fibrocalcific changes noted to the mitral valve leaflets with preserved   leaflet excursion.   Posterior mitral annular calcification noted.   Trace to mild mitral regurgitation is present.   The tricuspid valve leaflets appear mildly thickened and/or calcified,   but   open well.   Mild to moderate Tricuspid regurgitation is present. Mild pulmonary   hypertension.     No evidence of pericardial effusion.      < end of copied text >

## 2019-07-22 NOTE — PROGRESS NOTE ADULT - PROVIDER SPECIALTY LIST ADULT
Cardiology
Hospitalist
Trauma Surgery
Hospitalist
Cardiology
Trauma Surgery

## 2019-07-22 NOTE — PROGRESS NOTE ADULT - REASON FOR ADMISSION
sacral fracture with pelvic hematoma
sacral fracture with pelvic hematoma  PAF
sacral fracture with pelvic hematoma
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
## 2019-07-26 DIAGNOSIS — H40.9 UNSPECIFIED GLAUCOMA: ICD-10-CM

## 2019-07-26 DIAGNOSIS — E86.0 DEHYDRATION: ICD-10-CM

## 2019-07-26 DIAGNOSIS — M81.0 AGE-RELATED OSTEOPOROSIS WITHOUT CURRENT PATHOLOGICAL FRACTURE: ICD-10-CM

## 2019-07-26 DIAGNOSIS — S32.10XA UNSPECIFIED FRACTURE OF SACRUM, INITIAL ENCOUNTER FOR CLOSED FRACTURE: ICD-10-CM

## 2019-07-26 DIAGNOSIS — D62 ACUTE POSTHEMORRHAGIC ANEMIA: ICD-10-CM

## 2019-07-26 DIAGNOSIS — Z96.643 PRESENCE OF ARTIFICIAL HIP JOINT, BILATERAL: ICD-10-CM

## 2019-07-26 DIAGNOSIS — E78.5 HYPERLIPIDEMIA, UNSPECIFIED: ICD-10-CM

## 2019-07-26 DIAGNOSIS — I10 ESSENTIAL (PRIMARY) HYPERTENSION: ICD-10-CM
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
## 2019-07-26 DIAGNOSIS — E43 UNSPECIFIED SEVERE PROTEIN-CALORIE MALNUTRITION: ICD-10-CM

## 2021-06-28 NOTE — ED PROVIDER NOTE - EYES, MLM
Complex Requirements: Extensive Undermining Performed?: Yes +left eye blind, chronic. right eye: pupil reactive, EOMI

## 2021-10-18 NOTE — PROVIDER CONTACT NOTE (OTHER) - NAME OF MD/NP/PA/DO NOTIFIED:
Dr Cintron
Dr Mixon
Dr. Alan
MD Olivas
Regular rate and rhythm, Heart sounds S1 S2 present, no murmurs, rubs or gallops

## 2025-05-19 NOTE — ED PROVIDER NOTE - DISPOSITION TYPE
Patient's HM shows they are overdue for Mammogram.  Care Everywhere and  files searched.  No results to attach to order nor HM updated.       DISCHARGE

## 2025-07-21 NOTE — SBIRT NOTE ADULT - NSSBIRTUNABLESTOP_GEN_A_CORE
[facility]  Office: [phone]  Sage Waters DO, Donato Valencia, DO, Roberto Carlos Amaral DO, Jomar Valentine, DO, Chery Gandhi MD, Bertha Ryan MD, Eric rPuitt MD, Marcia Burton MD,  Hunter Toscano MD, Kandice Scales MD, Bella Harvey MD,  Balwinder Chung DO, Halley Anthony MD, Parag Plascencia MD, Yan Waters DO, Zaida Tse MD,  Isrrael Yin DO, Tosin Whaley MD, Becky Torrez MD, Laura Olguin MD,  Tam Ordonez MD, Eduardo Stock MD, Neli Laughlin MD, Salazar Dutta MD, Tavon Dominguez MD, Dodie Breen MD, Jenaro Snell, DO, Lilia Brown MD, Mook Miguel DO, Rosas Serrano MD, Balwinder Lanier MD, Mohsin Reza, MD, Mary Edward MD, Shirley Waterhouse, CNP,  Argentina Philippe, CNP, Jenaro Mccabe, CNP,  Peace De La Fuente, DNP, Lakshmi Vee, CNP, Ysabel Watson, CNP, Mira Barillas, CNP, Carmencita Driver, CNP, Gabby Ireland, PA-C, Azra German, CNP, Man Ball, CNP,  Kylee Lanier, CNP, Myrna Tellez, CNP, Bruce Justin, PA-C, Susie Mobley, PA-C, Krystyna Samuels, CNP,        Autumn Ang, CNS, Giselle Encarnacion, CNP, Azalia Chappell, CNP         [facility]   IN-PATIENT SERVICE   [facility]    Progress Note    7/21/2025    8:06 AM    Name:   Claire Rubin  MRN:     [id number]     Acct:      [id number]   Room:   346/346-01   Day:  8  Admit Date:  7/12/2025 10:01 PM    PCP:   Bjorn Yost MD  Code Status:  Full Code    Subjective:     C/C:   Chief Complaint   Patient presents with    Fatigue     Reports generalized weakness     Interval History Status: not changed.     Pt is sitting up in bed.  She denies any fever, cough or chills.  No more loose BMs.  Her decub ulcer has a wound vac on it, but it's starting to smell   She is waiting for SNF placement, precert started.  General surgery did not feel that it needed debrided a week ago.  No fevers.     Brief History:     Per the medical record:  \"68 y.o. female is admitted for management of  Never